# Patient Record
Sex: MALE | Race: WHITE | Employment: OTHER | URBAN - METROPOLITAN AREA
[De-identification: names, ages, dates, MRNs, and addresses within clinical notes are randomized per-mention and may not be internally consistent; named-entity substitution may affect disease eponyms.]

---

## 2019-09-12 ENCOUNTER — HOSPITAL ENCOUNTER (OUTPATIENT)
Age: 69
Discharge: HOME | End: 2019-09-14
Payer: COMMERCIAL

## 2019-09-12 PROCEDURE — 87075 CULTR BACTERIA EXCEPT BLOOD: CPT

## 2019-09-12 PROCEDURE — 87205 SMEAR GRAM STAIN: CPT

## 2019-09-12 PROCEDURE — 87070 CULTURE OTHR SPECIMN AEROBIC: CPT

## 2019-09-13 LAB — GRAM STAIN ORDERABLE: NORMAL

## 2019-09-14 LAB — WOUND/ABSCESS: NORMAL

## 2019-09-15 LAB
ANAEROBIC CULTURE: ABNORMAL
ANAEROBIC CULTURE: ABNORMAL
ORGANISM: ABNORMAL

## 2019-09-29 ENCOUNTER — HOSPITAL ENCOUNTER (INPATIENT)
Age: 69
LOS: 1 days | Discharge: HOME | DRG: 200 | End: 2019-09-30
Attending: EMERGENCY MEDICINE | Admitting: SURGERY
Payer: COMMERCIAL

## 2019-09-29 ENCOUNTER — APPOINTMENT (OUTPATIENT)
Dept: CT IMAGING | Age: 69
End: 2019-09-29
Payer: COMMERCIAL

## 2019-09-29 ENCOUNTER — HOSPITAL ENCOUNTER (OUTPATIENT)
Age: 69
Discharge: HOME | End: 2019-09-29
Payer: COMMERCIAL

## 2019-09-29 ENCOUNTER — HOSPITAL ENCOUNTER (EMERGENCY)
Age: 69
Discharge: ANOTHER HOSPITAL | End: 2019-09-29
Attending: EMERGENCY MEDICINE
Payer: COMMERCIAL

## 2019-09-29 VITALS
HEART RATE: 50 BPM | OXYGEN SATURATION: 98 % | DIASTOLIC BLOOD PRESSURE: 59 MMHG | WEIGHT: 160 LBS | TEMPERATURE: 98 F | HEIGHT: 66 IN | RESPIRATION RATE: 20 BRPM | SYSTOLIC BLOOD PRESSURE: 135 MMHG | BODY MASS INDEX: 25.71 KG/M2

## 2019-09-29 DIAGNOSIS — S22.42XA CLOSED FRACTURE OF MULTIPLE RIBS OF LEFT SIDE, INITIAL ENCOUNTER: Primary | ICD-10-CM

## 2019-09-29 DIAGNOSIS — S22.42XA CLOSED FRACTURE OF MULTIPLE RIBS OF LEFT SIDE, INITIAL ENCOUNTER: ICD-10-CM

## 2019-09-29 DIAGNOSIS — S27.1XXA TRAUMATIC HEMOTHORAX, INITIAL ENCOUNTER: Primary | ICD-10-CM

## 2019-09-29 PROBLEM — T14.90XA TRAUMA: Status: ACTIVE | Noted: 2019-09-29

## 2019-09-29 LAB
ALBUMIN SERPL-MCNC: 3.5 G/DL (ref 3.5–5.2)
ALP BLD-CCNC: 111 U/L (ref 40–129)
ALT SERPL-CCNC: 31 U/L (ref 0–40)
ANION GAP SERPL CALCULATED.3IONS-SCNC: 8 MMOL/L (ref 7–16)
AST SERPL-CCNC: 63 U/L (ref 0–39)
BASOPHILS ABSOLUTE: 0.05 E9/L (ref 0–0.2)
BASOPHILS RELATIVE PERCENT: 0.6 % (ref 0–2)
BILIRUB SERPL-MCNC: 0.8 MG/DL (ref 0–1.2)
BILIRUBIN DIRECT: 0.2 MG/DL (ref 0–0.3)
BILIRUBIN URINE: NEGATIVE
BILIRUBIN, INDIRECT: 0.6 MG/DL (ref 0–1)
BLOOD, URINE: NEGATIVE
BUN BLDV-MCNC: 12 MG/DL (ref 8–23)
CALCIUM SERPL-MCNC: 8.3 MG/DL (ref 8.6–10.2)
CHLORIDE BLD-SCNC: 99 MMOL/L (ref 98–107)
CLARITY: CLEAR
CO2: 30 MMOL/L (ref 22–29)
COLOR: YELLOW
CREAT SERPL-MCNC: 0.8 MG/DL (ref 0.7–1.2)
EOSINOPHILS ABSOLUTE: 0.23 E9/L (ref 0.05–0.5)
EOSINOPHILS RELATIVE PERCENT: 2.7 % (ref 0–6)
GFR AFRICAN AMERICAN: >60
GFR NON-AFRICAN AMERICAN: >60 ML/MIN/1.73
GLUCOSE BLD-MCNC: 104 MG/DL (ref 74–99)
GLUCOSE URINE: NEGATIVE MG/DL
HCT VFR BLD CALC: 44.1 % (ref 37–54)
HEMOGLOBIN: 14.7 G/DL (ref 12.5–16.5)
IMMATURE GRANULOCYTES #: 0.04 E9/L
IMMATURE GRANULOCYTES %: 0.5 % (ref 0–5)
INR BLD: 1
KETONES, URINE: NEGATIVE MG/DL
LEUKOCYTE ESTERASE, URINE: NEGATIVE
LYMPHOCYTES ABSOLUTE: 1.77 E9/L (ref 1.5–4)
LYMPHOCYTES RELATIVE PERCENT: 20.6 % (ref 20–42)
MCH RBC QN AUTO: 32.6 PG (ref 26–35)
MCHC RBC AUTO-ENTMCNC: 33.3 % (ref 32–34.5)
MCV RBC AUTO: 97.8 FL (ref 80–99.9)
MONOCYTES ABSOLUTE: 0.66 E9/L (ref 0.1–0.95)
MONOCYTES RELATIVE PERCENT: 7.7 % (ref 2–12)
NEUTROPHILS ABSOLUTE: 5.86 E9/L (ref 1.8–7.3)
NEUTROPHILS RELATIVE PERCENT: 67.9 % (ref 43–80)
NITRITE, URINE: NEGATIVE
PDW BLD-RTO: 11.4 FL (ref 11.5–15)
PH UA: 7.5 (ref 5–9)
PLATELET # BLD: 204 E9/L (ref 130–450)
PMV BLD AUTO: 10.6 FL (ref 7–12)
POTASSIUM REFLEX MAGNESIUM: 4.5 MMOL/L (ref 3.5–5)
PROTEIN UA: NEGATIVE MG/DL
PROTHROMBIN TIME: 11.3 SEC (ref 9.3–12.4)
RBC # BLD: 4.51 E12/L (ref 3.8–5.8)
SODIUM BLD-SCNC: 137 MMOL/L (ref 132–146)
SPECIFIC GRAVITY UA: 1.01 (ref 1–1.03)
TOTAL PROTEIN: 6.2 G/DL (ref 6.4–8.3)
UROBILINOGEN, URINE: 1 E.U./DL
WBC # BLD: 8.6 E9/L (ref 4.5–11.5)

## 2019-09-29 PROCEDURE — A0427 ALS1-EMERGENCY: HCPCS

## 2019-09-29 PROCEDURE — 80048 BASIC METABOLIC PNL TOTAL CA: CPT

## 2019-09-29 PROCEDURE — A0425 GROUND MILEAGE: HCPCS

## 2019-09-29 PROCEDURE — 85610 PROTHROMBIN TIME: CPT

## 2019-09-29 PROCEDURE — 99222 1ST HOSP IP/OBS MODERATE 55: CPT | Performed by: SURGERY

## 2019-09-29 PROCEDURE — 96374 THER/PROPH/DIAG INJ IV PUSH: CPT

## 2019-09-29 PROCEDURE — 99285 EMERGENCY DEPT VISIT HI MDM: CPT

## 2019-09-29 PROCEDURE — 74160 CT ABDOMEN W/CONTRAST: CPT

## 2019-09-29 PROCEDURE — 1200000000 HC SEMI PRIVATE

## 2019-09-29 PROCEDURE — 70450 CT HEAD/BRAIN W/O DYE: CPT

## 2019-09-29 PROCEDURE — 36415 COLL VENOUS BLD VENIPUNCTURE: CPT

## 2019-09-29 PROCEDURE — 81003 URINALYSIS AUTO W/O SCOPE: CPT

## 2019-09-29 PROCEDURE — 6360000002 HC RX W HCPCS: Performed by: EMERGENCY MEDICINE

## 2019-09-29 PROCEDURE — 80076 HEPATIC FUNCTION PANEL: CPT

## 2019-09-29 PROCEDURE — 6360000004 HC RX CONTRAST MEDICATION: Performed by: RADIOLOGY

## 2019-09-29 PROCEDURE — 71260 CT THORAX DX C+: CPT

## 2019-09-29 PROCEDURE — 85025 COMPLETE CBC W/AUTO DIFF WBC: CPT

## 2019-09-29 RX ORDER — 0.9 % SODIUM CHLORIDE 0.9 %
10 VIAL (ML) INJECTION 2 TIMES DAILY
Status: DISCONTINUED | OUTPATIENT
Start: 2019-09-29 | End: 2019-09-29 | Stop reason: HOSPADM

## 2019-09-29 RX ORDER — FENTANYL CITRATE 50 UG/ML
50 INJECTION, SOLUTION INTRAMUSCULAR; INTRAVENOUS ONCE
Status: COMPLETED | OUTPATIENT
Start: 2019-09-29 | End: 2019-09-29

## 2019-09-29 RX ORDER — METHOCARBAMOL 750 MG/1
750 TABLET, FILM COATED ORAL EVERY 6 HOURS PRN
Qty: 20 TABLET | Refills: 0 | Status: SHIPPED | OUTPATIENT
Start: 2019-09-29 | End: 2019-11-18

## 2019-09-29 RX ORDER — MORPHINE SULFATE 4 MG/ML
4 INJECTION, SOLUTION INTRAMUSCULAR; INTRAVENOUS ONCE
Status: COMPLETED | OUTPATIENT
Start: 2019-09-29 | End: 2019-09-29

## 2019-09-29 RX ORDER — SODIUM CHLORIDE 0.9 % (FLUSH) 0.9 %
SYRINGE (ML) INJECTION
Status: DISCONTINUED
Start: 2019-09-29 | End: 2019-09-29 | Stop reason: HOSPADM

## 2019-09-29 RX ADMIN — IOPAMIDOL 110 ML: 755 INJECTION, SOLUTION INTRAVENOUS at 13:18

## 2019-09-29 RX ADMIN — MORPHINE SULFATE 4 MG: 4 INJECTION, SOLUTION INTRAMUSCULAR; INTRAVENOUS at 12:26

## 2019-09-29 RX ADMIN — FENTANYL CITRATE 50 MCG: 50 INJECTION INTRAMUSCULAR; INTRAVENOUS at 16:07

## 2019-09-29 ASSESSMENT — ENCOUNTER SYMPTOMS
COUGH: 0
ABDOMINAL DISTENTION: 0
BACK PAIN: 1
TROUBLE SWALLOWING: 0
VOMITING: 0
CHEST TIGHTNESS: 0
ABDOMINAL PAIN: 0
NAUSEA: 0
VISUAL CHANGE: 0
BOWEL INCONTINENCE: 0
SHORTNESS OF BREATH: 1
PHOTOPHOBIA: 0

## 2019-09-29 ASSESSMENT — PAIN SCALES - GENERAL
PAINLEVEL_OUTOF10: 9
PAINLEVEL_OUTOF10: 8

## 2019-09-29 ASSESSMENT — PAIN DESCRIPTION - PAIN TYPE
TYPE: ACUTE PAIN

## 2019-09-29 ASSESSMENT — PAIN DESCRIPTION - DESCRIPTORS: DESCRIPTORS: SHARP

## 2019-09-29 ASSESSMENT — PAIN DESCRIPTION - LOCATION
LOCATION: RIB CAGE

## 2019-09-29 ASSESSMENT — PAIN DESCRIPTION - ORIENTATION
ORIENTATION: LEFT

## 2019-09-29 ASSESSMENT — PAIN DESCRIPTION - FREQUENCY: FREQUENCY: INTERMITTENT

## 2019-09-30 ENCOUNTER — APPOINTMENT (OUTPATIENT)
Dept: CT IMAGING | Age: 69
DRG: 200 | End: 2019-09-30
Payer: COMMERCIAL

## 2019-09-30 VITALS
RESPIRATION RATE: 16 BRPM | BODY MASS INDEX: 25.71 KG/M2 | TEMPERATURE: 97.3 F | WEIGHT: 160 LBS | DIASTOLIC BLOOD PRESSURE: 59 MMHG | OXYGEN SATURATION: 94 % | HEART RATE: 50 BPM | SYSTOLIC BLOOD PRESSURE: 120 MMHG | HEIGHT: 66 IN

## 2019-09-30 LAB
ANION GAP SERPL CALCULATED.3IONS-SCNC: 12 MMOL/L (ref 7–16)
BUN BLDV-MCNC: 11 MG/DL (ref 8–23)
CALCIUM SERPL-MCNC: 8.2 MG/DL (ref 8.6–10.2)
CHLORIDE BLD-SCNC: 100 MMOL/L (ref 98–107)
CO2: 27 MMOL/L (ref 22–29)
CREAT SERPL-MCNC: 0.8 MG/DL (ref 0.7–1.2)
GFR AFRICAN AMERICAN: >60
GFR NON-AFRICAN AMERICAN: >60 ML/MIN/1.73
GLUCOSE BLD-MCNC: 109 MG/DL (ref 74–99)
HCT VFR BLD CALC: 40.4 % (ref 37–54)
HEMOGLOBIN: 13.2 G/DL (ref 12.5–16.5)
MCH RBC QN AUTO: 31.8 PG (ref 26–35)
MCHC RBC AUTO-ENTMCNC: 32.7 % (ref 32–34.5)
MCV RBC AUTO: 97.3 FL (ref 80–99.9)
PDW BLD-RTO: 11.3 FL (ref 11.5–15)
PLATELET # BLD: 178 E9/L (ref 130–450)
PMV BLD AUTO: 10.9 FL (ref 7–12)
POTASSIUM REFLEX MAGNESIUM: 4.1 MMOL/L (ref 3.5–5)
RBC # BLD: 4.15 E12/L (ref 3.8–5.8)
SODIUM BLD-SCNC: 139 MMOL/L (ref 132–146)
WBC # BLD: 7.2 E9/L (ref 4.5–11.5)

## 2019-09-30 PROCEDURE — 6360000002 HC RX W HCPCS: Performed by: STUDENT IN AN ORGANIZED HEALTH CARE EDUCATION/TRAINING PROGRAM

## 2019-09-30 PROCEDURE — 2580000003 HC RX 258: Performed by: STUDENT IN AN ORGANIZED HEALTH CARE EDUCATION/TRAINING PROGRAM

## 2019-09-30 PROCEDURE — 97162 PT EVAL MOD COMPLEX 30 MIN: CPT

## 2019-09-30 PROCEDURE — 97530 THERAPEUTIC ACTIVITIES: CPT

## 2019-09-30 PROCEDURE — 85027 COMPLETE CBC AUTOMATED: CPT

## 2019-09-30 PROCEDURE — 80048 BASIC METABOLIC PNL TOTAL CA: CPT

## 2019-09-30 PROCEDURE — 72128 CT CHEST SPINE W/O DYE: CPT

## 2019-09-30 PROCEDURE — 72131 CT LUMBAR SPINE W/O DYE: CPT

## 2019-09-30 PROCEDURE — 36415 COLL VENOUS BLD VENIPUNCTURE: CPT

## 2019-09-30 PROCEDURE — 99238 HOSP IP/OBS DSCHRG MGMT 30/<: CPT | Performed by: SURGERY

## 2019-09-30 PROCEDURE — 6370000000 HC RX 637 (ALT 250 FOR IP): Performed by: STUDENT IN AN ORGANIZED HEALTH CARE EDUCATION/TRAINING PROGRAM

## 2019-09-30 PROCEDURE — 97165 OT EVAL LOW COMPLEX 30 MIN: CPT

## 2019-09-30 RX ORDER — NAPROXEN 500 MG/1
500 TABLET ORAL 2 TIMES DAILY
Status: DISCONTINUED | OUTPATIENT
Start: 2019-09-30 | End: 2019-09-30 | Stop reason: HOSPADM

## 2019-09-30 RX ORDER — SODIUM CHLORIDE 0.9 % (FLUSH) 0.9 %
10 SYRINGE (ML) INJECTION EVERY 12 HOURS SCHEDULED
Status: DISCONTINUED | OUTPATIENT
Start: 2019-09-30 | End: 2019-09-30 | Stop reason: HOSPADM

## 2019-09-30 RX ORDER — METHOCARBAMOL 500 MG/1
1000 TABLET, FILM COATED ORAL 4 TIMES DAILY
Status: DISCONTINUED | OUTPATIENT
Start: 2019-09-30 | End: 2019-09-30 | Stop reason: HOSPADM

## 2019-09-30 RX ORDER — PROPRANOLOL HYDROCHLORIDE 80 MG/1
80 CAPSULE, EXTENDED RELEASE ORAL DAILY
Status: DISCONTINUED | OUTPATIENT
Start: 2019-09-30 | End: 2019-09-30 | Stop reason: HOSPADM

## 2019-09-30 RX ORDER — MORPHINE SULFATE 4 MG/ML
4 INJECTION, SOLUTION INTRAMUSCULAR; INTRAVENOUS
Status: DISCONTINUED | OUTPATIENT
Start: 2019-09-30 | End: 2019-09-30 | Stop reason: HOSPADM

## 2019-09-30 RX ORDER — DOCUSATE SODIUM 100 MG/1
100 CAPSULE, LIQUID FILLED ORAL 2 TIMES DAILY
Status: DISCONTINUED | OUTPATIENT
Start: 2019-09-30 | End: 2019-09-30 | Stop reason: HOSPADM

## 2019-09-30 RX ORDER — ONDANSETRON 2 MG/ML
4 INJECTION INTRAMUSCULAR; INTRAVENOUS EVERY 6 HOURS PRN
Status: DISCONTINUED | OUTPATIENT
Start: 2019-09-30 | End: 2019-09-30 | Stop reason: HOSPADM

## 2019-09-30 RX ORDER — POLYETHYLENE GLYCOL 3350 17 G/17G
17 POWDER, FOR SOLUTION ORAL DAILY
Status: DISCONTINUED | OUTPATIENT
Start: 2019-09-30 | End: 2019-09-30 | Stop reason: HOSPADM

## 2019-09-30 RX ORDER — POLYVINYL ALCOHOL 14 MG/ML
1 SOLUTION/ DROPS OPHTHALMIC EVERY 4 HOURS PRN
Status: DISCONTINUED | OUTPATIENT
Start: 2019-09-30 | End: 2019-09-30 | Stop reason: HOSPADM

## 2019-09-30 RX ORDER — SODIUM CHLORIDE 0.9 % (FLUSH) 0.9 %
10 SYRINGE (ML) INJECTION PRN
Status: DISCONTINUED | OUTPATIENT
Start: 2019-09-30 | End: 2019-09-30 | Stop reason: HOSPADM

## 2019-09-30 RX ORDER — ACETAMINOPHEN 325 MG/1
650 TABLET ORAL EVERY 4 HOURS PRN
Status: DISCONTINUED | OUTPATIENT
Start: 2019-09-30 | End: 2019-09-30 | Stop reason: HOSPADM

## 2019-09-30 RX ORDER — OXYCODONE HYDROCHLORIDE 15 MG/1
30 TABLET ORAL EVERY 8 HOURS PRN
Status: DISCONTINUED | OUTPATIENT
Start: 2019-09-30 | End: 2019-09-30 | Stop reason: HOSPADM

## 2019-09-30 RX ORDER — TAMSULOSIN HYDROCHLORIDE 0.4 MG/1
0.4 CAPSULE ORAL DAILY
Status: DISCONTINUED | OUTPATIENT
Start: 2019-09-30 | End: 2019-09-30 | Stop reason: HOSPADM

## 2019-09-30 RX ADMIN — MORPHINE SULFATE 4 MG: 4 INJECTION, SOLUTION INTRAMUSCULAR; INTRAVENOUS at 07:33

## 2019-09-30 RX ADMIN — OXYCODONE HYDROCHLORIDE 30 MG: 15 TABLET ORAL at 03:12

## 2019-09-30 RX ADMIN — NAPROXEN 500 MG: 500 TABLET ORAL at 08:43

## 2019-09-30 RX ADMIN — METHOCARBAMOL TABLETS 1000 MG: 500 TABLET, COATED ORAL at 08:44

## 2019-09-30 RX ADMIN — MORPHINE SULFATE 4 MG: 4 INJECTION, SOLUTION INTRAMUSCULAR; INTRAVENOUS at 00:44

## 2019-09-30 RX ADMIN — TAMSULOSIN HYDROCHLORIDE 0.4 MG: 0.4 CAPSULE ORAL at 08:44

## 2019-09-30 RX ADMIN — NAPROXEN 500 MG: 500 TABLET ORAL at 00:44

## 2019-09-30 RX ADMIN — DOCUSATE SODIUM 100 MG: 100 CAPSULE, LIQUID FILLED ORAL at 08:44

## 2019-09-30 RX ADMIN — METHOCARBAMOL TABLETS 1000 MG: 500 TABLET, COATED ORAL at 01:00

## 2019-09-30 RX ADMIN — Medication 10 ML: at 08:43

## 2019-09-30 RX ADMIN — METHOCARBAMOL TABLETS 1000 MG: 500 TABLET, COATED ORAL at 13:38

## 2019-09-30 RX ADMIN — VORTIOXETINE 10 MG: 10 TABLET, FILM COATED ORAL at 08:44

## 2019-09-30 ASSESSMENT — PAIN DESCRIPTION - LOCATION
LOCATION: RIB CAGE

## 2019-09-30 ASSESSMENT — PAIN DESCRIPTION - ONSET
ONSET: ON-GOING

## 2019-09-30 ASSESSMENT — PAIN - FUNCTIONAL ASSESSMENT
PAIN_FUNCTIONAL_ASSESSMENT: PREVENTS OR INTERFERES SOME ACTIVE ACTIVITIES AND ADLS

## 2019-09-30 ASSESSMENT — PAIN DESCRIPTION - FREQUENCY
FREQUENCY: CONTINUOUS

## 2019-09-30 ASSESSMENT — PAIN SCALES - GENERAL
PAINLEVEL_OUTOF10: 8
PAINLEVEL_OUTOF10: 8
PAINLEVEL_OUTOF10: 4
PAINLEVEL_OUTOF10: 9

## 2019-09-30 ASSESSMENT — PAIN DESCRIPTION - DESCRIPTORS
DESCRIPTORS: ACHING;DISCOMFORT;SHARP
DESCRIPTORS: ACHING;DISCOMFORT;CONSTANT;SORE
DESCRIPTORS: ACHING;DISCOMFORT;SHARP

## 2019-09-30 ASSESSMENT — PAIN DESCRIPTION - PAIN TYPE
TYPE: ACUTE PAIN

## 2019-09-30 ASSESSMENT — PAIN DESCRIPTION - ORIENTATION
ORIENTATION: LEFT

## 2019-09-30 ASSESSMENT — PAIN DESCRIPTION - PROGRESSION
CLINICAL_PROGRESSION: NOT CHANGED

## 2022-04-28 ENCOUNTER — HOSPITAL ENCOUNTER (OUTPATIENT)
Age: 72
Discharge: HOME | End: 2022-04-30

## 2022-04-28 PROCEDURE — 88305 TISSUE EXAM BY PATHOLOGIST: CPT

## 2022-05-24 RX ORDER — DEXTROAMPHETAMINE SACCHARATE, AMPHETAMINE ASPARTATE, DEXTROAMPHETAMINE SULFATE AND AMPHETAMINE SULFATE 5; 5; 5; 5 MG/1; MG/1; MG/1; MG/1
20 TABLET ORAL 3 TIMES DAILY
COMMUNITY

## 2022-05-24 RX ORDER — DESVENLAFAXINE 100 MG/1
TABLET, EXTENDED RELEASE ORAL DAILY
COMMUNITY
End: 2022-07-07 | Stop reason: ALTCHOICE

## 2022-05-24 RX ORDER — DIAZEPAM 10 MG/1
10 TABLET ORAL NIGHTLY PRN
COMMUNITY

## 2022-05-24 RX ORDER — CHOLECALCIFEROL (VITAMIN D3) 125 MCG
10 CAPSULE ORAL NIGHTLY
COMMUNITY

## 2022-05-24 NOTE — PROGRESS NOTES
[facility] PRE-ADMISSION TESTING INSTRUCTIONS    The Preadmission Testing patient is instructed accordingly using the following criteria (check applicable):    ARRIVAL INSTRUCTIONS:  [x] Parking the day of Surgery is located in the Main Entrance lot. Upon entering the door, make an immediate right to the surgery reception desk    [x] Bring photo ID and insurance card    [] Bring in a copy of Living will or Durable Power of  papers. [x] Please be sure to arrange for responsible adult to provide transportation to and from the hospital    [x] Please arrange for responsible adult to be with you for the 24 hour period post procedure due to having anesthesia    [x] If you awake am of surgery not feeling well or have temperature >100 please call [phone]    GENERAL INSTRUCTIONS:    [x] Nothing by mouth after midnight, including gum, candy, mints or water    [x] You may brush your teeth, but do not swallow any water    [x] Take medications as instructed with 1-2 oz of water    [] Stop herbal supplements and vitamins 5 days prior to procedure    [] Follow preop dosing of blood thinners per physician instructions    [] Take 1/2 dose of evening insulin, but no insulin after midnight    [] No oral diabetic medications after midnight    [] If diabetic and have low blood sugar or feel symptomatic, take 1-2oz apple juice only    [] Bring inhalers day of surgery    [] Bring C-PAP/ Bi-Pap day of surgery    [] Bring urine specimen day of surgery    [x] Shower or bath with soap, lather and rinse well, AM of Surgery, no lotion, powders or creams to surgical site    [] Follow bowel prep as instructed per surgeon    [x] No tobacco products within 24 hours of surgery     [x] No alcohol or illegal drug use within 24 hours of surgery.     [x] Jewelry, body piercing's, eyeglasses, contact lenses and dentures are not permitted into surgery (bring cases)      [] Please do not wear any nail polish, make up or hair products on the day of surgery    [x] You can expect a call the business day prior to procedure to notify you if your arrival time changes    [x] If you receive a survey after surgery we would greatly appreciate your comments    [] Parent/guardian of a minor must accompany their child and remain on the premises  the entire time they are under our care     [] Pediatric patients may bring favorite toy, blanket or comfort item with them    [] A caregiver or family member must remain with the patient during their stay if they are mentally handicapped, have dementia, disoriented or unable to use a call light or would be a safety concern if left unattended    [x] Please notify surgeon if you develop any illness between now and time of surgery (cold, cough, sore throat, fever, nausea, vomiting) or any signs of infections  including skin, wounds, and dental.    [x]  The Outpatient Pharmacy is available to fill your prescription here on your day of surgery, ask your preop nurse for details    [] Other instructions    EDUCATIONAL MATERIALS PROVIDED:    [] PAT Preoperative Education Packet/Booklet     [] Medication List    [] Transfusion bracelet applied with instructions    [] Shower with soap, lather and rinse well, and use CHG wipes provided the evening before surgery as instructed    [] Incentive spirometer with instructions

## 2022-05-25 ENCOUNTER — PREP FOR PROCEDURE (OUTPATIENT)
Dept: GASTROENTEROLOGY | Age: 72
End: 2022-05-25

## 2022-05-25 DIAGNOSIS — R10.84 GENERALIZED ABDOMINAL PAIN: Primary | ICD-10-CM

## 2022-05-25 RX ORDER — SODIUM CHLORIDE 0.9 % (FLUSH) 0.9 %
5-40 SYRINGE (ML) INJECTION PRN
Status: CANCELLED | OUTPATIENT
Start: 2022-05-25

## 2022-05-25 RX ORDER — SODIUM CHLORIDE 9 MG/ML
INJECTION, SOLUTION INTRAVENOUS CONTINUOUS
Status: CANCELLED | OUTPATIENT
Start: 2022-05-25

## 2022-05-25 RX ORDER — SODIUM CHLORIDE 9 MG/ML
25 INJECTION, SOLUTION INTRAVENOUS PRN
Status: CANCELLED | OUTPATIENT
Start: 2022-05-25

## 2022-05-25 RX ORDER — SODIUM CHLORIDE, SODIUM LACTATE, POTASSIUM CHLORIDE, CALCIUM CHLORIDE 600; 310; 30; 20 MG/100ML; MG/100ML; MG/100ML; MG/100ML
1000 INJECTION, SOLUTION INTRAVENOUS CONTINUOUS
Status: CANCELLED | OUTPATIENT
Start: 2022-05-25

## 2022-05-25 RX ORDER — SODIUM CHLORIDE 0.9 % (FLUSH) 0.9 %
5-40 SYRINGE (ML) INJECTION EVERY 12 HOURS SCHEDULED
Status: CANCELLED | OUTPATIENT
Start: 2022-05-25

## 2022-05-25 NOTE — H&P
Gastroenterology      Pre-operative History and Physical      HISTORY OF PRESENT ILLNESS:   Nara Borrero is a 73year old  male patient who is seen at the request of Carlos Samano for a consultation/initial visit. Patient presents today with abnormal CT/MRI reports. Patient states \"I want my gallbladder out\". States he is having \"horrible pain in my stomach\". Anatomy of GB, pancreas, liver and bile ducts explained to the patient, all questions answered. States diarrhea is what started al of this work up. Diarrhea started over 1 year ago. Using Imodium & Lomotil daily, but again diarrhea is still present. Going 6-8 times a day. Stools are loose, with pita appearance. Reports he just had labs completed recently, will try to obtain. States his weight is fluctuating  few pounds at times. Denies any drastic weight loss recently. POC reviewed with the patient including the need for a ERCP, all questions answered. Patient also educated with the risk of pancreatitis with this procedure, will be kept over night for hospitalization to [facility] for pancreatitis. Just had a colonoscopy with Dr. Albertina Mayfield, \"couple polyps removed\".    Patient denies melena, hematochezia, hematemesis    HISTORY:   Past Medical History:   Diagnosis Date    Arthritis     OSTEO    Back problem     lumbar disc with sciatic pain down lt leg;    Depression     ADHD; bi-polar    Enlarged gallbladder     Essential tremor     Fatty liver     Hyperlipidemia     Irritable bowel syndrome     Osteoarthritis     Reflux esophagitis     Tremor     takes inderal for this    Vitamin D deficiency        PERTINENT FAMILY HISTORY:    Family History   Problem Relation Age of Onset    Coronary Art Dis Mother        MEDICATIONS:    Current Outpatient Medications:     desvenlafaxine succinate (PRISTIQ) 100 MG TB24 extended release tablet, Take by mouth daily, Disp: , Rfl:     amphetamine-dextroamphetamine (ADDERALL) 20 MG tablet, Take 20 mg by mouth 3 times daily. , Disp: , Rfl:     melatonin 5 mg TABS tablet, Take 5 mg by mouth nightly, Disp: , Rfl:     diazePAM (VALIUM) 10 MG tablet, Take 10 mg by mouth nightly., Disp: , Rfl:     naloxone 4 MG/0.1ML LIQD nasal spray, 1 spray by Nasal route as needed for Opioid Reversal, Disp: 1 each, Rfl: 5    tamsulosin (FLOMAX) 0.4 MG capsule, Take 1 capsule by mouth daily, Disp: 90 capsule, Rfl: 3    triamcinolone (KENALOG) 0.1 % cream, Apply topically 2 times daily. , Disp: 15 g, Rfl: 1    naproxen (NAPROSYN) 500 MG tablet, Take 1 tablet by mouth 2 times daily, Disp: 60 tablet, Rfl: 5    propranolol (INDERAL LA) 80 MG extended release capsule, Take 1 capsule by mouth daily, Disp: 180 capsule, Rfl: 1    diclofenac sodium 1 % GEL, Apply 2 g topically 2 times daily, Disp: 1 Tube, Rfl: 1    ALLERGIES:  Quinolones    PHYSICAL EXAM/GENERAL APPEARANCE:   Constitutional:  Appearance: well-developed, appropriate grooming, in no acute distress. .  Communication: conversation appropriate. Skin:  Inspection: no rashes, ulcers, icterus or other lesions. Eyes:  Conjunctivae/lids: lids normal,anicteric sclerae, moist conjunctivae. Pupils/irises: PERRLA. ENMT:  External: normal external inspection of the nose and ears, atraumautic. Hearing: within normal limits. Oropharynx: normal tongue, hard and soft palate; posterior pharynx without erythema, exudate or lesions. Neck:  Neck: normal motion and central trachea. Thyroid: normal size, consistency and position; no masses or tenderness. Respiratory:  Effort: normal respiratory effort and no intercostal retractions. Auscultation: normal breath sounds, no rubs, wheezes or rhonchi. Chest:  Inspection: symetrical without visualized masses. Cardiovascular:  Palpation: normal size,PMI is palpable in the 5th intercostal space, left midclavicular line,normal rythym. Auscultation: normal, S1 and S2,no gallops,no rubs or murmurs.   Gastrointestinal/Abdomen:  Abdomen: normal consistency, no tenderness or masses,normal bowel sounds. Liver/Spleen: normal,normal size,not palpable. Extremities:  RLE: no cyanosis, clubbing, or edema. Normal peripheral pulses. Heddy  LLE: no cyanosis, clubbing or edema. Normal peripheral pulses. Heddy  Psychiatric:  Orientation: oriented to time, space and person.     OTHER SIGNIFICANT FINDINGS: None    IMPRESSION/INITIAL DIAGNOSIS:   Ampullary stenosis  CBD dilation  Fatty liver  Diarrhea  Abdominal pain  Colon polyps          Electronically signed by Tuan Cruz MD on 5/25/2022 at 8:12 AM

## 2022-05-27 ENCOUNTER — ANESTHESIA (OUTPATIENT)
Dept: ENDOSCOPY | Age: 72
End: 2022-05-27
Payer: COMMERCIAL

## 2022-05-27 ENCOUNTER — APPOINTMENT (OUTPATIENT)
Dept: GENERAL RADIOLOGY | Age: 72
End: 2022-05-27
Attending: INTERNAL MEDICINE
Payer: COMMERCIAL

## 2022-05-27 ENCOUNTER — ANESTHESIA EVENT (OUTPATIENT)
Dept: ENDOSCOPY | Age: 72
End: 2022-05-27
Payer: COMMERCIAL

## 2022-05-27 ENCOUNTER — HOSPITAL ENCOUNTER (OUTPATIENT)
Age: 72
Setting detail: OBSERVATION
Discharge: HOME | End: 2022-05-28
Attending: INTERNAL MEDICINE | Admitting: INTERNAL MEDICINE
Payer: COMMERCIAL

## 2022-05-27 PROBLEM — Z98.890 S/P ERCP: Status: ACTIVE | Noted: 2022-05-27

## 2022-05-27 LAB
AMYLASE: 65 U/L (ref 20–100)
BASOPHILS ABSOLUTE: 0.05 E9/L (ref 0–0.2)
BASOPHILS RELATIVE PERCENT: 0.6 % (ref 0–2)
EOSINOPHILS ABSOLUTE: 0.15 E9/L (ref 0.05–0.5)
EOSINOPHILS RELATIVE PERCENT: 1.9 % (ref 0–6)
HCT VFR BLD CALC: 44.8 % (ref 37–54)
HEMOGLOBIN: 15 G/DL (ref 12.5–16.5)
IMMATURE GRANULOCYTES #: 0.02 E9/L
IMMATURE GRANULOCYTES %: 0.2 % (ref 0–5)
INR BLD: 0.9
LIPASE: 68 U/L (ref 13–60)
LYMPHOCYTES ABSOLUTE: 1.92 E9/L (ref 1.5–4)
LYMPHOCYTES RELATIVE PERCENT: 23.8 % (ref 20–42)
MCH RBC QN AUTO: 33.4 PG (ref 26–35)
MCHC RBC AUTO-ENTMCNC: 33.5 % (ref 32–34.5)
MCV RBC AUTO: 99.8 FL (ref 80–99.9)
MONOCYTES ABSOLUTE: 0.69 E9/L (ref 0.1–0.95)
MONOCYTES RELATIVE PERCENT: 8.5 % (ref 2–12)
NEUTROPHILS ABSOLUTE: 5.25 E9/L (ref 1.8–7.3)
NEUTROPHILS RELATIVE PERCENT: 65 % (ref 43–80)
PDW BLD-RTO: 12 FL (ref 11.5–15)
PLATELET # BLD: 198 E9/L (ref 130–450)
PMV BLD AUTO: 10.9 FL (ref 7–12)
PROTHROMBIN TIME: 10.2 SEC (ref 9.3–12.4)
RBC # BLD: 4.49 E12/L (ref 3.8–5.8)
WBC # BLD: 8.1 E9/L (ref 4.5–11.5)

## 2022-05-27 PROCEDURE — 6370000000 HC RX 637 (ALT 250 FOR IP): Performed by: INTERNAL MEDICINE

## 2022-05-27 PROCEDURE — 2500000003 HC RX 250 WO HCPCS: Performed by: NURSE ANESTHETIST, CERTIFIED REGISTERED

## 2022-05-27 PROCEDURE — C1874 STENT, COATED/COV W/DEL SYS: HCPCS | Performed by: INTERNAL MEDICINE

## 2022-05-27 PROCEDURE — 3700000001 HC ADD 15 MINUTES (ANESTHESIA): Performed by: INTERNAL MEDICINE

## 2022-05-27 PROCEDURE — 2580000003 HC RX 258: Performed by: NURSE ANESTHETIST, CERTIFIED REGISTERED

## 2022-05-27 PROCEDURE — 2500000003 HC RX 250 WO HCPCS

## 2022-05-27 PROCEDURE — 74330 X-RAY BILE/PANC ENDOSCOPY: CPT

## 2022-05-27 PROCEDURE — 2720000010 HC SURG SUPPLY STERILE: Performed by: INTERNAL MEDICINE

## 2022-05-27 PROCEDURE — 6360000002 HC RX W HCPCS: Performed by: ANESTHESIOLOGY

## 2022-05-27 PROCEDURE — 2709999900 HC NON-CHARGEABLE SUPPLY: Performed by: INTERNAL MEDICINE

## 2022-05-27 PROCEDURE — G0378 HOSPITAL OBSERVATION PER HR: HCPCS

## 2022-05-27 PROCEDURE — 2580000003 HC RX 258: Performed by: INTERNAL MEDICINE

## 2022-05-27 PROCEDURE — 6360000002 HC RX W HCPCS

## 2022-05-27 PROCEDURE — 6360000004 HC RX CONTRAST MEDICATION: Performed by: RADIOLOGY

## 2022-05-27 PROCEDURE — 6370000000 HC RX 637 (ALT 250 FOR IP): Performed by: NURSE PRACTITIONER

## 2022-05-27 PROCEDURE — 7100000011 HC PHASE II RECOVERY - ADDTL 15 MIN: Performed by: INTERNAL MEDICINE

## 2022-05-27 PROCEDURE — 7100000010 HC PHASE II RECOVERY - FIRST 15 MIN: Performed by: INTERNAL MEDICINE

## 2022-05-27 PROCEDURE — 88305 TISSUE EXAM BY PATHOLOGIST: CPT

## 2022-05-27 PROCEDURE — C1769 GUIDE WIRE: HCPCS | Performed by: INTERNAL MEDICINE

## 2022-05-27 PROCEDURE — 6360000002 HC RX W HCPCS: Performed by: INTERNAL MEDICINE

## 2022-05-27 PROCEDURE — 88108 CYTOPATH CONCENTRATE TECH: CPT

## 2022-05-27 PROCEDURE — 3609015100 HC ERCP STENT PLACEMENT BILIARY/PANCREATIC DUCT: Performed by: INTERNAL MEDICINE

## 2022-05-27 PROCEDURE — 83690 ASSAY OF LIPASE: CPT

## 2022-05-27 PROCEDURE — 85610 PROTHROMBIN TIME: CPT

## 2022-05-27 PROCEDURE — 36415 COLL VENOUS BLD VENIPUNCTURE: CPT

## 2022-05-27 PROCEDURE — 6360000002 HC RX W HCPCS: Performed by: NURSE ANESTHETIST, CERTIFIED REGISTERED

## 2022-05-27 PROCEDURE — 82150 ASSAY OF AMYLASE: CPT

## 2022-05-27 PROCEDURE — 3700000000 HC ANESTHESIA ATTENDED CARE: Performed by: INTERNAL MEDICINE

## 2022-05-27 PROCEDURE — 85025 COMPLETE CBC W/AUTO DIFF WBC: CPT

## 2022-05-27 DEVICE — STENT SYSTEM RMV
Type: IMPLANTABLE DEVICE | Status: NON-FUNCTIONAL
Brand: WALLFLEX BILIARY
Removed: 2022-07-11

## 2022-05-27 RX ORDER — DEXTROAMPHETAMINE SACCHARATE, AMPHETAMINE ASPARTATE, DEXTROAMPHETAMINE SULFATE AND AMPHETAMINE SULFATE 5; 5; 5; 5 MG/1; MG/1; MG/1; MG/1
20 TABLET ORAL 3 TIMES DAILY
Status: DISCONTINUED | OUTPATIENT
Start: 2022-05-27 | End: 2022-05-28 | Stop reason: HOSPADM

## 2022-05-27 RX ORDER — FENTANYL CITRATE 50 UG/ML
25 INJECTION, SOLUTION INTRAMUSCULAR; INTRAVENOUS EVERY 5 MIN PRN
Status: DISCONTINUED | OUTPATIENT
Start: 2022-05-27 | End: 2022-05-27 | Stop reason: HOSPADM

## 2022-05-27 RX ORDER — NALOXONE HYDROCHLORIDE 0.4 MG/ML
0.4 INJECTION, SOLUTION INTRAMUSCULAR; INTRAVENOUS; SUBCUTANEOUS PRN
Status: DISCONTINUED | OUTPATIENT
Start: 2022-05-27 | End: 2022-05-28 | Stop reason: HOSPADM

## 2022-05-27 RX ORDER — DIPHENOXYLATE HYDROCHLORIDE AND ATROPINE SULFATE 2.5; .025 MG/1; MG/1
2 TABLET ORAL DAILY PRN
COMMUNITY

## 2022-05-27 RX ORDER — CETIRIZINE HYDROCHLORIDE 10 MG/1
10 TABLET ORAL DAILY
COMMUNITY

## 2022-05-27 RX ORDER — SODIUM CHLORIDE 9 MG/ML
INJECTION, SOLUTION INTRAVENOUS PRN
Status: DISCONTINUED | OUTPATIENT
Start: 2022-05-27 | End: 2022-05-27 | Stop reason: HOSPADM

## 2022-05-27 RX ORDER — SODIUM CHLORIDE 9 MG/ML
INJECTION, SOLUTION INTRAVENOUS CONTINUOUS
Status: DISCONTINUED | OUTPATIENT
Start: 2022-05-27 | End: 2022-05-28 | Stop reason: HOSPADM

## 2022-05-27 RX ORDER — DIPHENHYDRAMINE HYDROCHLORIDE 50 MG/ML
12.5 INJECTION INTRAMUSCULAR; INTRAVENOUS
Status: DISCONTINUED | OUTPATIENT
Start: 2022-05-27 | End: 2022-05-27 | Stop reason: HOSPADM

## 2022-05-27 RX ORDER — SODIUM CHLORIDE, SODIUM LACTATE, POTASSIUM CHLORIDE, CALCIUM CHLORIDE 600; 310; 30; 20 MG/100ML; MG/100ML; MG/100ML; MG/100ML
1000 INJECTION, SOLUTION INTRAVENOUS CONTINUOUS
Status: DISCONTINUED | OUTPATIENT
Start: 2022-05-27 | End: 2022-05-27

## 2022-05-27 RX ORDER — MEPERIDINE HYDROCHLORIDE 25 MG/ML
12.5 INJECTION INTRAMUSCULAR; INTRAVENOUS; SUBCUTANEOUS ONCE
Status: DISCONTINUED | OUTPATIENT
Start: 2022-05-27 | End: 2022-05-27 | Stop reason: HOSPADM

## 2022-05-27 RX ORDER — PROPOFOL 10 MG/ML
INJECTION, EMULSION INTRAVENOUS CONTINUOUS PRN
Status: DISCONTINUED | OUTPATIENT
Start: 2022-05-27 | End: 2022-05-27 | Stop reason: SDUPTHER

## 2022-05-27 RX ORDER — SODIUM CHLORIDE 0.9 % (FLUSH) 0.9 %
5-40 SYRINGE (ML) INJECTION EVERY 12 HOURS SCHEDULED
Status: DISCONTINUED | OUTPATIENT
Start: 2022-05-27 | End: 2022-05-27 | Stop reason: HOSPADM

## 2022-05-27 RX ORDER — OXYCODONE HYDROCHLORIDE 30 MG/1
30 TABLET ORAL EVERY 6 HOURS PRN
COMMUNITY

## 2022-05-27 RX ORDER — SODIUM CHLORIDE 9 MG/ML
INJECTION, SOLUTION INTRAVENOUS CONTINUOUS PRN
Status: DISCONTINUED | OUTPATIENT
Start: 2022-05-27 | End: 2022-05-27 | Stop reason: SDUPTHER

## 2022-05-27 RX ORDER — TAMSULOSIN HYDROCHLORIDE 0.4 MG/1
0.4 CAPSULE ORAL DAILY
Status: DISCONTINUED | OUTPATIENT
Start: 2022-05-27 | End: 2022-05-28 | Stop reason: HOSPADM

## 2022-05-27 RX ORDER — HYDRALAZINE HYDROCHLORIDE 20 MG/ML
5 INJECTION INTRAMUSCULAR; INTRAVENOUS
Status: DISCONTINUED | OUTPATIENT
Start: 2022-05-27 | End: 2022-05-27 | Stop reason: HOSPADM

## 2022-05-27 RX ORDER — NALOXONE HYDROCHLORIDE 4 MG/.1ML
1 SPRAY NASAL PRN
Status: DISCONTINUED | OUTPATIENT
Start: 2022-05-27 | End: 2022-05-27 | Stop reason: RX

## 2022-05-27 RX ORDER — ONDANSETRON 2 MG/ML
4 INJECTION INTRAMUSCULAR; INTRAVENOUS EVERY 4 HOURS PRN
Status: DISCONTINUED | OUTPATIENT
Start: 2022-05-27 | End: 2022-05-28 | Stop reason: HOSPADM

## 2022-05-27 RX ORDER — NAPROXEN 500 MG/1
500 TABLET ORAL 2 TIMES DAILY
Status: DISCONTINUED | OUTPATIENT
Start: 2022-05-27 | End: 2022-05-28 | Stop reason: HOSPADM

## 2022-05-27 RX ORDER — SODIUM CHLORIDE 9 MG/ML
25 INJECTION, SOLUTION INTRAVENOUS PRN
Status: DISCONTINUED | OUTPATIENT
Start: 2022-05-27 | End: 2022-05-28 | Stop reason: HOSPADM

## 2022-05-27 RX ORDER — GLYCOPYRROLATE 0.2 MG/ML
INJECTION INTRAMUSCULAR; INTRAVENOUS PRN
Status: DISCONTINUED | OUTPATIENT
Start: 2022-05-27 | End: 2022-05-27 | Stop reason: SDUPTHER

## 2022-05-27 RX ORDER — LANOLIN ALCOHOL/MO/W.PET/CERES
5 CREAM (GRAM) TOPICAL NIGHTLY
Status: DISCONTINUED | OUTPATIENT
Start: 2022-05-27 | End: 2022-05-28 | Stop reason: HOSPADM

## 2022-05-27 RX ORDER — PROCHLORPERAZINE EDISYLATE 5 MG/ML
5 INJECTION INTRAMUSCULAR; INTRAVENOUS
Status: DISCONTINUED | OUTPATIENT
Start: 2022-05-27 | End: 2022-05-27 | Stop reason: HOSPADM

## 2022-05-27 RX ORDER — SODIUM CHLORIDE 0.9 % (FLUSH) 0.9 %
5-40 SYRINGE (ML) INJECTION PRN
Status: DISCONTINUED | OUTPATIENT
Start: 2022-05-27 | End: 2022-05-27 | Stop reason: HOSPADM

## 2022-05-27 RX ORDER — DIAZEPAM 5 MG/1
10 TABLET ORAL NIGHTLY
Status: DISCONTINUED | OUTPATIENT
Start: 2022-05-27 | End: 2022-05-28 | Stop reason: HOSPADM

## 2022-05-27 RX ORDER — SODIUM CHLORIDE 0.9 % (FLUSH) 0.9 %
5-40 SYRINGE (ML) INJECTION EVERY 12 HOURS SCHEDULED
Status: DISCONTINUED | OUTPATIENT
Start: 2022-05-27 | End: 2022-05-28 | Stop reason: HOSPADM

## 2022-05-27 RX ORDER — PROPRANOLOL HYDROCHLORIDE 80 MG/1
80 CAPSULE, EXTENDED RELEASE ORAL DAILY
Status: DISCONTINUED | OUTPATIENT
Start: 2022-05-28 | End: 2022-05-28 | Stop reason: HOSPADM

## 2022-05-27 RX ORDER — LABETALOL HYDROCHLORIDE 5 MG/ML
5 INJECTION, SOLUTION INTRAVENOUS
Status: DISCONTINUED | OUTPATIENT
Start: 2022-05-27 | End: 2022-05-27 | Stop reason: HOSPADM

## 2022-05-27 RX ORDER — SODIUM CHLORIDE 0.9 % (FLUSH) 0.9 %
5-40 SYRINGE (ML) INJECTION PRN
Status: DISCONTINUED | OUTPATIENT
Start: 2022-05-27 | End: 2022-05-28 | Stop reason: HOSPADM

## 2022-05-27 RX ADMIN — INDOMETHACIN 100 MG: 50 SUPPOSITORY RECTAL at 14:44

## 2022-05-27 RX ADMIN — DIAZEPAM 10 MG: 5 TABLET ORAL at 22:08

## 2022-05-27 RX ADMIN — Medication 4.5 MG: at 22:08

## 2022-05-27 RX ADMIN — PROPOFOL 70 MG: 10 INJECTION, EMULSION INTRAVENOUS at 14:47

## 2022-05-27 RX ADMIN — GLYCOPYRROLATE 0.2 MG: 0.2 INJECTION, SOLUTION INTRAMUSCULAR; INTRAVENOUS at 14:45

## 2022-05-27 RX ADMIN — HYDROMORPHONE HYDROCHLORIDE 0.5 MG: 1 INJECTION, SOLUTION INTRAMUSCULAR; INTRAVENOUS; SUBCUTANEOUS at 15:33

## 2022-05-27 RX ADMIN — GLUCAGON HYDROCHLORIDE 0.25 MG: KIT at 14:58

## 2022-05-27 RX ADMIN — SODIUM CHLORIDE: 9 INJECTION, SOLUTION INTRAVENOUS at 13:18

## 2022-05-27 RX ADMIN — PROPOFOL 85 MCG/KG/MIN: 10 INJECTION, EMULSION INTRAVENOUS at 14:49

## 2022-05-27 RX ADMIN — NAPROXEN 500 MG: 500 TABLET ORAL at 21:00

## 2022-05-27 RX ADMIN — GLUCAGON HYDROCHLORIDE 0.25 MG: KIT at 15:03

## 2022-05-27 RX ADMIN — HYDROMORPHONE HYDROCHLORIDE 0.5 MG: 1 INJECTION, SOLUTION INTRAMUSCULAR; INTRAVENOUS; SUBCUTANEOUS at 20:40

## 2022-05-27 RX ADMIN — SODIUM CHLORIDE: 9 INJECTION, SOLUTION INTRAVENOUS at 14:44

## 2022-05-27 RX ADMIN — BENZOCAINE AND MENTHOL 1 LOZENGE: 15; 3.6 LOZENGE ORAL at 19:31

## 2022-05-27 RX ADMIN — GLUCAGON HYDROCHLORIDE 0.25 MG: KIT at 14:55

## 2022-05-27 RX ADMIN — IOPAMIDOL 23 ML: 612 INJECTION, SOLUTION INTRAVENOUS at 15:20

## 2022-05-27 RX ADMIN — SODIUM CHLORIDE 3000 MG: 9 INJECTION, SOLUTION INTRAVENOUS at 13:19

## 2022-05-27 ASSESSMENT — PAIN - FUNCTIONAL ASSESSMENT
PAIN_FUNCTIONAL_ASSESSMENT: ACTIVITIES ARE NOT PREVENTED
PAIN_FUNCTIONAL_ASSESSMENT: NONE - DENIES PAIN

## 2022-05-27 ASSESSMENT — PAIN DESCRIPTION - LOCATION
LOCATION: ABDOMEN;BACK
LOCATION: BACK
LOCATION: THROAT
LOCATION: ABDOMEN
LOCATION: ABDOMEN

## 2022-05-27 ASSESSMENT — PAIN DESCRIPTION - DESCRIPTORS
DESCRIPTORS: ACHING;DISCOMFORT;DULL;SORE
DESCRIPTORS: DISCOMFORT;DULL;ACHING
DESCRIPTORS: ACHING
DESCRIPTORS: DISCOMFORT;ACHING

## 2022-05-27 ASSESSMENT — PAIN SCALES - GENERAL
PAINLEVEL_OUTOF10: 7
PAINLEVEL_OUTOF10: 2
PAINLEVEL_OUTOF10: 7
PAINLEVEL_OUTOF10: 3
PAINLEVEL_OUTOF10: 7
PAINLEVEL_OUTOF10: 4
PAINLEVEL_OUTOF10: 8

## 2022-05-27 ASSESSMENT — PAIN DESCRIPTION - PAIN TYPE: TYPE: ACUTE PAIN;CHRONIC PAIN

## 2022-05-27 ASSESSMENT — LIFESTYLE VARIABLES: SMOKING_STATUS: 0

## 2022-05-27 ASSESSMENT — PAIN DESCRIPTION - ONSET: ONSET: GRADUAL

## 2022-05-27 ASSESSMENT — PAIN DESCRIPTION - ORIENTATION
ORIENTATION: MID
ORIENTATION: RIGHT;LOWER
ORIENTATION: UPPER
ORIENTATION: RIGHT;LEFT;LOWER

## 2022-05-27 NOTE — BRIEF OP NOTE
Brief Postoperative Note    Kusum Jama  YOB: 1950  [id number]    Procedure:  ERCP    Anesthesia: [facility]      Surgeon:  Ashley Paredes MD      Findings: CBD: DILATED WITH STRICTURE AT THE PAPILLARY LEVEL.  PAPILLOTOMY WAS DONE , AREA  BRUSHED FOR CYTOLOGY AND 8X60 FULLY COVERED WALL STENT WAS PLACED WITH EXCELLENT DRAINAGE                      PD: NOT ENTERED                        Complications: None      Estimated blood loss: none        Tae Hermosillo MD

## 2022-05-27 NOTE — DISCHARGE SUMMARY
Patient name Emilie Fletcher  MRN [id number]  ADMITTED: 5/27/22 DISCHARGED: 5/28/22    ADMITTING DIAGNOSES:  1. Abdominal Pain  2. Diarrhea   3. Ampullary stenosis  4. CBD dilation    DISCHARGE DIAGNOSES:  ERCP with papillotomy and Wallstent placed  Cytology pending    DISCHARGE MEDICATIONS:  Lomotil PRN  Zyrtec 10 MG QHS  Pristiq 100 MG QD  Kenalog cream PRN  Adderall 20 MG QD  Melatonin 5 MG QHS  Valium 10 MG PRN  Flomax 0.4 MG QD  Naprosyn 500 MG BID  Propranolol 80 MG QD    DISCHARGE INSTRUCTIONS:  Patient to follow up in our office accordingly. Patient to stay hydrated, follow low fat diet. Followup as outpatient for Wallstent removal in 4-6 weeks. BRIEF HISTORY OF HOSPITALIZATION:  Thuy Sow is a 73year old male who was admitted to the hospital s/p outpatient ERCP with papillotomy and Wallstent  placement. Patient tolerating clears, will be advanced to lo fat diet. Lipase 1164 this AM, patient is asymptomatic. Patient will be discharged to home and follow up as outpatient for Wallstent removal in 4-6 weeks. Office to arrange.      Discharged per Dr. Yusef Smith, APRN - CNP NP-C 5/28/22 10:18 AM  for Alina Gamino M.D.

## 2022-05-27 NOTE — ANESTHESIA PRE PROCEDURE
Department of Anesthesiology  Preprocedure Note       Name:  Kishan Richard   Age:  73 y.o.  :  1950                                          MRN:  [id number]         Date:  2022      Surgeon: Elvin Ness):  Carleen Levy MD    Procedure: Procedure(s):  ERCP DIAGNOSTIC (RADIOLOGY NOTIFIED)    Medications prior to admission:   Prior to Admission medications    Medication Sig Start Date End Date Taking? Authorizing Provider   desvenlafaxine succinate (PRISTIQ) 100 MG TB24 extended release tablet Take by mouth daily   Yes Historical Provider, MD   amphetamine-dextroamphetamine (ADDERALL) 20 MG tablet Take 20 mg by mouth 3 times daily. Yes Historical Provider, MD   melatonin 5 mg TABS tablet Take 5 mg by mouth nightly   Yes Historical Provider, MD   diazePAM (VALIUM) 10 MG tablet Take 10 mg by mouth nightly. Yes Historical Provider, MD   naloxone 4 MG/0.1ML LIQD nasal spray 1 spray by Nasal route as needed for Opioid Reversal 21   Radhika Burgos PA-C   tamsulosin [facility]) 0.4 MG capsule Take 1 capsule by mouth daily 21   Deyanira Tran MD   triamcinolone (KENALOG) 0.1 % cream Apply topically 2 times daily.  20   Deyanira Tran MD   naproxen (NAPROSYN) 500 MG tablet Take 1 tablet by mouth 2 times daily 6/15/20   Radhika Burgos PA-C   propranolol (INDERAL LA) 80 MG extended release capsule Take 1 capsule by mouth daily 19   Deyanira Tran MD   diclofenac sodium 1 % GEL Apply 2 g topically 2 times daily 18   Deyanira Tran MD       Current medications:    Current Facility-Administered Medications   Medication Dose Route Frequency Provider Last Rate Last Admin    ampicillin-sulbactam (UNASYN) 3000 mg in 100 mL NS IVPB minibag  3,000 mg IntraVENous 60 Min Pre-Op Carleen Levy  mL/hr at 22 1319 3,000 mg at 22 1319    indomethacin (INDOCIN) 50 MG suppository 100 mg  100 mg Rectal 60 Min Pre-Op Carleen Levy MD        lactated ringers infusion 1,000 mL  1,000 mL IntraVENous Continuous Ishmael Randall MD        0.9 % sodium chloride infusion   IntraVENous Continuous Ishmael Randall MD 50 mL/hr at 05/27/22 1318 New Bag at 05/27/22 1318    0.9 % sodium chloride infusion  25 mL IntraVENous PRN Ishmael Randall MD        sodium chloride flush 0.9 % injection 5-40 mL  5-40 mL IntraVENous 2 times per day Ishmael Randall MD        sodium chloride flush 0.9 % injection 5-40 mL  5-40 mL IntraVENous PRN Ishmael Randall MD           Allergies:  No Known Allergies    Problem List:    Patient Active Problem List   Diagnosis Code    Osteoarthritis of left knee M17.12    DDD (degenerative disc disease) [id number]    Discitis M46.40    Cataract H26.9    Trauma T14.90XA    Multiple closed fractures of ribs of left side S22.42XA    Hemothorax on left J94.2       Past Medical History:        Diagnosis Date    Arthritis     OSTEO    Back problem     lumbar disc with sciatic pain down lt leg;    Depression     ADHD; bi-polar    Enlarged gallbladder     Essential tremor     Fatty liver     Hyperlipidemia     Irritable bowel syndrome     Osteoarthritis     Reflux esophagitis     Tremor     takes inderal for this    Vitamin D deficiency        Past Surgical History:        Procedure Laterality Date    BONE BIOPSY  12/2204553    percutaneous boipsy L5 S1 and interspace biopsy    CARPAL TUNNEL RELEASE      bilat    CATARACT REMOVAL WITH IMPLANT Left 7/16/14    CATARACT REMOVAL WITH IMPLANT Right 8/14/2014    COLONOSCOPY  11/07/2014    JOINT REPLACEMENT  2012    total left knee    KNEE ARTHROSCOPY      right    OTHER SURGICAL HISTORY      lt ulnar nerve     SHOULDER ARTHROSCOPY Bilateral      for rt & lt arthritis     VITRECTOMY Left 12/18/2013       Social History:    Social History     Tobacco Use    Smoking status: Former Smoker     Packs/day: 1.00     Years: 10.00     Pack years: 10.00    Smokeless tobacco: Never Used   Substance Use Topics    Alcohol use: Yes     Alcohol/week: 14.0 standard drinks     Types: 14 Cans of beer per week                                Counseling given: Not Answered      Vital Signs (Current):   Vitals:    05/24/22 1551 05/27/22 1255   BP:  (!) 173/74   Pulse:  77   Resp:  15   Temp:  97.5 °F (36.4 °C)   TempSrc:  Temporal   SpO2:  98%   Weight: 160 lb (72.6 kg) 160 lb (72.6 kg)   Height: 5' 6\" (1.676 m) 5' 6\" (1.676 m)                                              BP Readings from Last 3 Encounters:   05/27/22 (!) 173/74   11/18/19 110/70   09/30/19 (!) 120/59       NPO Status: Time of last liquid consumption: 2200                        Time of last solid consumption: 2200                        Date of last liquid consumption: 05/26/22                        Date of last solid food consumption: 05/26/22    BMI:   Wt Readings from Last 3 Encounters:   05/27/22 160 lb (72.6 kg)   11/18/19 162 lb (73.5 kg)   09/29/19 160 lb (72.6 kg)     Body mass index is 25.82 kg/m². CBC:   Lab Results   Component Value Date    WBC 8.1 05/27/2022    RBC 4.49 05/27/2022    HGB 15.0 05/27/2022    HCT 44.8 05/27/2022    MCV 99.8 05/27/2022    RDW 12.0 05/27/2022     05/27/2022       CMP:   Lab Results   Component Value Date     09/30/2019    K 4.1 09/30/2019     09/30/2019    CO2 27 09/30/2019    BUN 11 09/30/2019    CREATININE 0.8 09/30/2019    GFRAA >60 09/30/2019    LABGLOM >60 09/30/2019    GLUCOSE 109 09/30/2019    PROT 6.2 09/29/2019    CALCIUM 8.2 09/30/2019    BILITOT 0.8 09/29/2019    ALKPHOS 111 09/29/2019    AST 63 09/29/2019    ALT 31 09/29/2019       POC Tests: No results for input(s): POCGLU, POCNA, POCK, POCCL, POCBUN, POCHEMO, POCHCT in the last 72 hours.     Coags:   Lab Results   Component Value Date    PROTIME 10.2 05/27/2022    PROTIME 14.2 04/30/2012    INR 0.9 05/27/2022       HCG (If Applicable): No results found for: PREGTESTUR, PREGSERUM, HCG, HCGQUANT     ABGs: No results found for: PHART, PO2ART, OFS9NKT, PRD4UJQ, BEART, S7EQXFKS     Type & Screen (If Applicable):  No results found for: LABABO, LABRH    Drug/Infectious Status (If Applicable):  No results found for: HIV, HEPCAB    COVID-19 Screening (If Applicable): No results found for: COVID19        Anesthesia Evaluation  Patient summary reviewed and Nursing notes reviewed no history of anesthetic complications:   Airway: Mallampati: III          Dental:          Pulmonary: breath sounds clear to auscultation      (-) not a current smoker          Patient did not smoke on day of surgery. ROS comment: Left hemothorax   Cardiovascular:  Exercise tolerance: good (>4 METS),   (+) hyperlipidemia        Rhythm: regular  Rate: normal           Beta Blocker:  Dose within 24 Hrs         Neuro/Psych:   (+) psychiatric history (ADHD):depression/anxiety  (Bipolar)             ROS comment: Tremor - managed with Inderal GI/Hepatic/Renal:   (+) GERD (Reflux esophagitis): no interval change, liver disease:,          ROS comment: IBS  Colon polyps  Abdominal pain  Diarrhea  Hx ETOH abuse. Endo/Other:    (+) : arthritis (OA, DJD, DDD, sciatica, and chronic pain): OA., . Pt had no PAT visit       Abdominal:             Vascular: negative vascular ROS. Other Findings:           Anesthesia Plan      MAC     ASA 3       Induction: intravenous. MIPS: Postoperative opioids intended and Prophylactic antiemetics administered. Anesthetic plan and risks discussed with patient. Plan discussed with CRNA. Yanick Parrish DO   5/27/2022      History, data, and pertinent studies from chart review. Above represents information available via the shared medical record including previous anesthetic, medication, and allergy history.   Confirmation of above and final disposition per DOS anesthesiologist.    Yanick Parrish DO  Staff Anesthesiologist  May 27, 2022  2:13 PM  Chart reviewed, patient seen and agree with above evaluation.   Duyen Leavitt, APRN - CRNA

## 2022-05-28 VITALS
OXYGEN SATURATION: 97 % | HEIGHT: 66 IN | SYSTOLIC BLOOD PRESSURE: 106 MMHG | HEART RATE: 50 BPM | RESPIRATION RATE: 16 BRPM | TEMPERATURE: 97.8 F | DIASTOLIC BLOOD PRESSURE: 53 MMHG | WEIGHT: 169.6 LBS | BODY MASS INDEX: 27.26 KG/M2

## 2022-05-28 LAB
ALBUMIN SERPL-MCNC: 2.8 G/DL (ref 3.5–5.2)
ALP BLD-CCNC: 94 U/L (ref 40–129)
ALT SERPL-CCNC: 25 U/L (ref 0–40)
AST SERPL-CCNC: 41 U/L (ref 0–39)
BILIRUB SERPL-MCNC: 0.5 MG/DL (ref 0–1.2)
BILIRUBIN DIRECT: <0.2 MG/DL (ref 0–0.3)
BILIRUBIN, INDIRECT: ABNORMAL MG/DL (ref 0–1)
HCT VFR BLD CALC: 37.2 % (ref 37–54)
HEMOGLOBIN: 12.4 G/DL (ref 12.5–16.5)
LIPASE: 1164 U/L (ref 13–60)
MCH RBC QN AUTO: 32.9 PG (ref 26–35)
MCHC RBC AUTO-ENTMCNC: 33.3 % (ref 32–34.5)
MCV RBC AUTO: 98.7 FL (ref 80–99.9)
PDW BLD-RTO: 11.9 FL (ref 11.5–15)
PLATELET # BLD: 125 E9/L (ref 130–450)
PMV BLD AUTO: 11.6 FL (ref 7–12)
RBC # BLD: 3.77 E12/L (ref 3.8–5.8)
TOTAL PROTEIN: 5.2 G/DL (ref 6.4–8.3)
WBC # BLD: 5.9 E9/L (ref 4.5–11.5)

## 2022-05-28 PROCEDURE — 80076 HEPATIC FUNCTION PANEL: CPT

## 2022-05-28 PROCEDURE — 85027 COMPLETE CBC AUTOMATED: CPT

## 2022-05-28 PROCEDURE — 6370000000 HC RX 637 (ALT 250 FOR IP): Performed by: INTERNAL MEDICINE

## 2022-05-28 PROCEDURE — 36415 COLL VENOUS BLD VENIPUNCTURE: CPT

## 2022-05-28 PROCEDURE — 2580000003 HC RX 258: Performed by: INTERNAL MEDICINE

## 2022-05-28 PROCEDURE — 6360000002 HC RX W HCPCS: Performed by: INTERNAL MEDICINE

## 2022-05-28 PROCEDURE — 83690 ASSAY OF LIPASE: CPT

## 2022-05-28 PROCEDURE — G0378 HOSPITAL OBSERVATION PER HR: HCPCS

## 2022-05-28 RX ADMIN — SODIUM CHLORIDE: 9 INJECTION, SOLUTION INTRAVENOUS at 01:27

## 2022-05-28 RX ADMIN — NAPROXEN 500 MG: 500 TABLET ORAL at 08:48

## 2022-05-28 RX ADMIN — HYDROMORPHONE HYDROCHLORIDE 0.5 MG: 1 INJECTION, SOLUTION INTRAMUSCULAR; INTRAVENOUS; SUBCUTANEOUS at 01:25

## 2022-05-28 RX ADMIN — TAMSULOSIN HYDROCHLORIDE 0.4 MG: 0.4 CAPSULE ORAL at 08:48

## 2022-05-28 ASSESSMENT — PAIN DESCRIPTION - LOCATION: LOCATION: BACK

## 2022-05-28 ASSESSMENT — PAIN SCALES - GENERAL
PAINLEVEL_OUTOF10: 7
PAINLEVEL_OUTOF10: 0

## 2022-05-28 NOTE — OP NOTE
[address] [address]                                OPERATIVE REPORT    PATIENT NAME: Dana Velazquez                     :        1950  MED REC NO:   [id number]                            ROOM:       0515  ACCOUNT NO:   [de-identified]                           ADMIT DATE: 2022  PROVIDER:     Germán Myers MD    DATE OF PROCEDURE:  2022    PROCEDURE PERFORMED:  ERCP with papillotomy, brushing, and Wallstent  placement. PREOPERATIVE DIAGNOSES:  Abnormal liver enzymes and dilated biliary  tree. POSTOPERATIVE DIAGNOSES:  Common bile duct dilatation with a stenotic  distal and intrapapillary portion. Papillotomy was done. Papilla  appeared meaty and thick. Strictured area was brushed for cytology and  an 8-[place] x 60 mm fully-covered Wallstent was placed across the  strictured area with excellent drainage obtained. SURGEON:  Germán Myers MD.    ANESTHESIA:  LMAC. DESCRIPTION OF PROCEDURE:  With the patient in his left lateral  decubitus position, the Olympus side-viewing video duodenoscope was  introduced into the esophagus, advanced through the GE junction and into  the gastric body, advanced through the pylorus into the duodenal bulb  and second portion of the duodenum, where papilla was visualized and  positioned at 12 o'clock position. Using a papillotome, common bile  duct was cannulated and deep cannulation was obtained using a guidewire. Cholangiography showed dilated common bile duct with strictured distal  portion and into the intrapapillary area. Over the guidewire, the  papillotome was adjusted and an adequately sized papillotomy was  performed, albeit slowly secondary to a very thick, meaty papilla. After the papillotomy was done, brushing for cytology was carried out  from that area.   Over the guidewire, the brush was then exchanged with  #8-[place] x 60 mm fully-covered Wallstent, which was placed without  difficulty. Excellent drainage was obtained. Intrahepatic duct also  was dilated.   Pancreatic duct was not cannulated during this exam.        Katerin Queen MD    D: 05/27/2022 15:35:10       T: 05/27/2022 15:38:44     [email or web address]  Job#: [id number]     Doc#: [id number]    CC:  MD Misti Keating MD

## 2022-05-31 NOTE — H&P
Gastroenterology      Pre-operative History and Physical      HISTORY OF PRESENT ILLNESS:   Jamarcus Rodriguez is a 73year old  male patient who is seen at the request of Darian Bedolla for a consultation/initial visit. Patient presents today with abnormal CT/MRI reports. Patient states \"I want my gallbladder out\". States he is having \"horrible pain in my stomach\". Anatomy of GB, pancreas, liver and bile ducts explained to the patient, all questions answered. States diarrhea is what started al of this work up. Diarrhea started over 1 year ago. Using Imodium & Lomotil daily, but again diarrhea is still present. Going 6-8 times a day. Stools are loose, with pita appearance. Reports he just had labs completed recently, will try to obtain. States his weight is fluctuating  few pounds at times. Denies any drastic weight loss recently. POC reviewed with the patient including the need for a ERCP, all questions answered. Patient also educated with the risk of pancreatitis with this procedure, will be kept over night for hospitalization to [facility] for pancreatitis. Just had a colonoscopy with Dr. Tena Hdz, \"couple polyps removed\".    Patient denies melena, hematochezia, hematemesis    HISTORY:   Past Medical History:   Diagnosis Date    Arthritis     OSTEO    Back problem     lumbar disc with sciatic pain down lt leg;    Depression     ADHD; bi-polar    Enlarged gallbladder     Essential tremor     Fatty liver     Hyperlipidemia     Irritable bowel syndrome     Osteoarthritis     Reflux esophagitis     Tremor     takes inderal for this    Vitamin D deficiency        PERTINENT FAMILY HISTORY:    Family History   Problem Relation Age of Onset    Coronary Art Dis Mother        MEDICATIONS:    Current Outpatient Medications:     diphenoxylate-atropine (LOMOTIL) 2.5-0.025 MG per tablet, Take 2 tablets by mouth daily as needed for Diarrhea., Disp: , Rfl:     oxyCODONE (OXY-IR) 30 MG immediate release tablet, Take 30 mg by mouth every 6 hours as needed for Pain., Disp: , Rfl:     cetirizine (ZYRTEC) 10 MG tablet, Take 10 mg by mouth daily, Disp: , Rfl:     desvenlafaxine succinate (PRISTIQ) 100 MG TB24 extended release tablet, Take by mouth daily, Disp: , Rfl:     amphetamine-dextroamphetamine (ADDERALL) 20 MG tablet, Take 20 mg by mouth 3 times daily. , Disp: , Rfl:     melatonin 5 mg TABS tablet, Take 10 mg by mouth nightly , Disp: , Rfl:     diazePAM (VALIUM) 10 MG tablet, Take 10 mg by mouth nightly as needed. , Disp: , Rfl:     naloxone 4 MG/0.1ML LIQD nasal spray, 1 spray by Nasal route as needed for Opioid Reversal, Disp: 1 each, Rfl: 5    tamsulosin (FLOMAX) 0.4 MG capsule, Take 1 capsule by mouth daily, Disp: 90 capsule, Rfl: 3    triamcinolone (KENALOG) 0.1 % cream, Apply topically 2 times daily. , Disp: 15 g, Rfl: 1    naproxen (NAPROSYN) 500 MG tablet, Take 1 tablet by mouth 2 times daily (Patient taking differently: Take 250 mg by mouth 2 times daily ), Disp: 60 tablet, Rfl: 5    propranolol (INDERAL LA) 80 MG extended release capsule, Take 1 capsule by mouth daily, Disp: 180 capsule, Rfl: 1    diclofenac sodium 1 % GEL, Apply 2 g topically 2 times daily, Disp: 1 Tube, Rfl: 1    ALLERGIES:  Patient has no known allergies. PHYSICAL EXAM/GENERAL APPEARANCE:   Constitutional:  Appearance: well-developed, appropriate grooming, in no acute distress. .  Communication: conversation appropriate. Skin:  Inspection: no rashes, ulcers, icterus or other lesions. Eyes:  Conjunctivae/lids: lids normal,anicteric sclerae, moist conjunctivae. Pupils/irises: PERRLA. ENMT:  External: normal external inspection of the nose and ears, atraumautic. Hearing: within normal limits. Oropharynx: normal tongue, hard and soft palate; posterior pharynx without erythema, exudate or lesions. Neck:  Neck: normal motion and central trachea.   Thyroid: normal size, consistency and position; no masses or tenderness. Respiratory:  Effort: normal respiratory effort and no intercostal retractions. Auscultation: normal breath sounds, no rubs, wheezes or rhonchi. Chest:  Inspection: symetrical without visualized masses. Cardiovascular:  Palpation: normal size,PMI is palpable in the 5th intercostal space, left midclavicular line,normal rythym. Auscultation: normal, S1 and S2,no gallops,no rubs or murmurs. Gastrointestinal/Abdomen:  Abdomen: normal consistency, no tenderness or masses,normal bowel sounds. Liver/Spleen: normal,normal size,not palpable. Extremities:  RLE: no cyanosis, clubbing, or edema. Normal peripheral pulses. Quirino Jerome LLE: no cyanosis, clubbing or edema. Normal peripheral pulses. Quirino Jerome Psychiatric:  Orientation: oriented to time, space and person.     OTHER SIGNIFICANT FINDINGS: None    IMPRESSION/INITIAL DIAGNOSIS:   Ampullary stenosis  CBD dilation  Fatty liver  Diarrhea  Abdominal pain  Colon polyps          Electronically signed by Bryce Sellers MD on 5/31/2022 at 8:29 AM

## 2022-07-07 NOTE — PROGRESS NOTES
[facility] PRE-ADMISSION TESTING INSTRUCTIONS    The Preadmission Testing patient is instructed accordingly using the following criteria (check applicable):    ARRIVAL INSTRUCTIONS:  [x] Parking the day of Surgery is located in the Main Entrance lot. Upon entering the door, make an immediate right to the surgery reception desk    [x] Bring photo ID and insurance card    [] Bring in a copy of Living will or Durable Power of  papers. [x] Please be sure to arrange for responsible adult to provide transportation to and from the hospital    [x] Please arrange for responsible adult to be with you for the 24 hour period post procedure due to having anesthesia    [x] If you awake am of surgery not feeling well or have temperature >100 please call [phone]    GENERAL INSTRUCTIONS:    [x] Nothing by mouth after midnight, including gum, candy, mints or water    [x] You may brush your teeth, but do not swallow any water    [x] Take medications as instructed with 1-2 oz of water    [x] Stop herbal supplements and vitamins 5 days prior to procedure    [x] Follow preop dosing of blood thinners per physician instructions    [] Take 1/2 dose of evening insulin, but no insulin after midnight    [] No oral diabetic medications after midnight    [] If diabetic and have low blood sugar or feel symptomatic, take 1-2oz apple juice only    [] Bring inhalers day of surgery    [] Bring C-PAP/ Bi-Pap day of surgery    [] Bring urine specimen day of surgery    [x] Shower or bath with soap, lather and rinse well, AM of Surgery, no lotion, powders or creams to surgical site    [] Follow bowel prep as instructed per surgeon    [x] No tobacco products within 24 hours of surgery     [x] No alcohol or illegal drug use within 24 hours of surgery.     [x] Jewelry, body piercing's, eyeglasses, contact lenses and dentures are not permitted into surgery (bring cases)      [] Please do not wear any nail polish, make up or hair products on the day of surgery    [x] You can expect a call the business day prior to procedure to notify you if your arrival time changes    [x] If you receive a survey after surgery we would greatly appreciate your comments    [] Parent/guardian of a minor must accompany their child and remain on the premises  the entire time they are under our care     [] Pediatric patients may bring favorite toy, blanket or comfort item with them    [] A caregiver or family member must remain with the patient during their stay if they are mentally handicapped, have dementia, disoriented or unable to use a call light or would be a safety concern if left unattended    [x] Please notify surgeon if you develop any illness between now and time of surgery (cold, cough, sore throat, fever, nausea, vomiting) or any signs of infections  including skin, wounds, and dental.    [x]  The Outpatient Pharmacy is available to fill your prescription here on your day of surgery, ask your preop nurse for details    [] Other instructions    EDUCATIONAL MATERIALS PROVIDED:    [] PAT Preoperative Education Packet/Booklet     [] Medication List    [] Transfusion bracelet applied with instructions    [] Shower with soap, lather and rinse well, and use CHG wipes provided the evening before surgery as instructed    [] Incentive spirometer with instructions

## 2022-07-08 ENCOUNTER — PREP FOR PROCEDURE (OUTPATIENT)
Dept: GASTROENTEROLOGY | Age: 72
End: 2022-07-08

## 2022-07-08 RX ORDER — SODIUM CHLORIDE 9 MG/ML
INJECTION, SOLUTION INTRAVENOUS CONTINUOUS
Status: CANCELLED | OUTPATIENT
Start: 2022-07-08

## 2022-07-08 RX ORDER — SODIUM CHLORIDE 9 MG/ML
25 INJECTION, SOLUTION INTRAVENOUS PRN
Status: CANCELLED | OUTPATIENT
Start: 2022-07-08

## 2022-07-08 RX ORDER — SODIUM CHLORIDE 0.9 % (FLUSH) 0.9 %
5-40 SYRINGE (ML) INJECTION PRN
Status: CANCELLED | OUTPATIENT
Start: 2022-07-08

## 2022-07-08 RX ORDER — SODIUM CHLORIDE 0.9 % (FLUSH) 0.9 %
5-40 SYRINGE (ML) INJECTION EVERY 12 HOURS SCHEDULED
Status: CANCELLED | OUTPATIENT
Start: 2022-07-08

## 2022-07-08 NOTE — H&P
Gastroenterology      Pre-operative History and Physical      HISTORY OF PRESENT ILLNESS:   Lawanda Monge is a 73year old male of Dr Pretty Jarrett seen in consultation 5/25/22 for ERCP. Patient pres with abnormal CT/MRI reports. Pt reported abdominal pain. Anatomy of GB, pancreas, liver and bile ducts explained to the patient, all questions answered. States diarrhea is what started his work up. Diarrhea started over 1 year ago. Using Imodium & Lomotil daily without resolve. Pt having 6-8 bouts a day, loose and with pita appearance. States his weight is fluctuating  few pounds at times. Denies any drastic weight loss recently. States he had a colonoscopy with Dr. Alicia Carrel, \"couple polyps removed\". Surgical pathology in [facility] 4/28/22 - A.  Duodenum, biopsy: Duodenal mucosa with features of chronic peptic duodenitis. Architecture is intact with appropriate villous to crypt length ratios. Negative for increased intraepithelial lymphocytes. B. Terminal ileum, biopsy: Fragments of terminal ileal mucosa with no significant pathologic findings. Prominent intramucosal lymphoid aggregates present, some with reactive germinal centers. Architecture is intact with appropriate villous to crypt length ratios. Albertus Clos, random biopsy: Multiple fragments of colonic mucosa with no significant pathologic findings. Prior Pathology 11/7/14 in [facility] - Colon, cecum, biopsy:  Adenomatous polyp (tubular adenoma). Patient denies melena, hematochezia, hematemesis. Pt was admitted to the hospital s/p outpatient ERCP with papillotomy, brushing (negative for malignant cells), and Wallstent placement 5/27/22 which demonstrated common bile duct dilatation with a stenotic distal and intrapapillary portion. Papillotomy was done. Papilla appeared meaty and thick. Strictured area was brushed for cytology and an 8-Georgian x 60 mm fully-covered Wallstent was placed across the strictured area with excellent drainage obtained.  Patient tolerating clears, will be advanced to lo fat diet. Lipase 1164 upon discharge, patient asymptomatic. Patient was discharged to home in stable condition and [facility] HISTORY:   Past Medical History:   Diagnosis Date    Arthritis     OSTEO    Back problem     lumbar disc with sciatic pain down lt leg;    Depression     ADHD; bi-polar    Enlarged gallbladder     Essential tremor     Fatty liver     Hyperlipidemia     Irritable bowel syndrome     Osteoarthritis     Reflux esophagitis     Vitamin D deficiency        PERTINENT FAMILY HISTORY:    Family History   Problem Relation Age of Onset    Coronary Art Dis Mother        MEDICATIONS:    Current Outpatient Medications:     diphenoxylate-atropine (LOMOTIL) 2.5-0.025 MG per tablet, Take 2 tablets by mouth daily as needed for Diarrhea., Disp: , Rfl:     oxyCODONE (OXY-IR) 30 MG immediate release tablet, Take 30 mg by mouth every 6 hours as needed for Pain., Disp: , Rfl:     cetirizine (ZYRTEC) 10 MG tablet, Take 10 mg by mouth daily, Disp: , Rfl:     amphetamine-dextroamphetamine (ADDERALL) 20 MG tablet, Take 20 mg by mouth 3 times daily. , Disp: , Rfl:     melatonin 5 mg TABS tablet, Take 10 mg by mouth nightly , Disp: , Rfl:     diazePAM (VALIUM) 10 MG tablet, Take 10 mg by mouth nightly as needed. , Disp: , Rfl:     naloxone 4 MG/0.1ML LIQD nasal spray, 1 spray by Nasal route as needed for Opioid Reversal, Disp: 1 each, Rfl: 5    tamsulosin (FLOMAX) 0.4 MG capsule, Take 1 capsule by mouth daily, Disp: 90 capsule, Rfl: 3    triamcinolone (KENALOG) 0.1 % cream, Apply topically 2 times daily. , Disp: 15 g, Rfl: 1    naproxen (NAPROSYN) 500 MG tablet, Take 1 tablet by mouth 2 times daily (Patient taking differently: Take 250 mg by mouth 2 times daily ), Disp: 60 tablet, Rfl: 5    propranolol (INDERAL LA) 80 MG extended release capsule, Take 1 capsule by mouth daily, Disp: 180 capsule, Rfl: 1    diclofenac sodium 1 % GEL, Apply 2 g topically 2 times daily, Disp: 1 Tube, Rfl: 1    ALLERGIES:  Patient has no known allergies. PHYSICAL EXAM/GENERAL APPEARANCE:     Vital Signs:  BP  (mmHg)  Pulse  (bpm) Weight (lbs/oz) Height (ft/in) BMI Temp  146/122 77 Weight not taken: Patient Refused Treatment 5 / 7  97.9 (F)  Physical Exam:  Constitutional:  Appearance: well-developed, appropriate grooming, in no acute distress, in wheelchair . [facility] Communication: conversation appropriate. Skin:  Inspection: no rashes, ulcers, icterus or other lesions. Eyes:  Conjunctivae/lids: lids normal,anicteric sclerae, moist conjunctivae. Pupils/irises: PERRLA. ENMT:  External: normal external inspection of the nose and ears, atraumautic. Hearing: within normal limits. Oropharynx: normal tongue, hard and soft palate; posterior pharynx without erythema, exudate or lesions. Neck:  Neck: normal motion and central trachea. Thyroid: normal size, consistency and position; no masses or tenderness. Respiratory:  Effort: normal respiratory effort and no intercostal retractions. Auscultation: normal breath sounds, no rubs, wheezes or rhonchi. Chest:  Inspection: symetrical without visualized masses. Cardiovascular:  Palpation: normal size,PMI is palpable in the 5th intercostal space, left midclavicular line,normal rythym. Auscultation: normal, S1 and S2,no gallops,no rubs or murmurs. Gastrointestinal/Abdomen:  Abdomen: normal consistency, no tenderness or masses,normal bowel sounds. Liver/Spleen: normal,normal size,not palpable. Extremities:  RLE: no edema. LLE: no edema. Psychiatric:  Orientation: oriented to time, space and person. OTHER SIGNIFICANT FINDINGS: None    IMPRESSION/INITIAL DIAGNOSIS:   S/P ERCP with brushing (negative malignant cells) and wallstent placement 5/27/22 - Common bile duct dilatation with a stenotic distal and intrapapillary portion. Papillotomy was done. Papilla appeared meaty and thick.   Strictured area was brushed for cytology and an 8-Serbian x 60 mm fully-covered Wallstent was placed across the strictured area with excellent drainage obtained.   Ampullary stenosis  CBD dilation  Fatty liver  Diarrhea  Abdominal pain  Colon polyps    PLAN:  ERCP with stone and stent removal     Electronically signed by Dennie Salen, MD on 7/8/2022 at 11:58 AM

## 2022-07-11 ENCOUNTER — ANESTHESIA EVENT (OUTPATIENT)
Dept: ENDOSCOPY | Age: 72
End: 2022-07-11
Payer: COMMERCIAL

## 2022-07-11 ENCOUNTER — ANESTHESIA (OUTPATIENT)
Dept: ENDOSCOPY | Age: 72
End: 2022-07-11
Payer: COMMERCIAL

## 2022-07-11 ENCOUNTER — APPOINTMENT (OUTPATIENT)
Dept: GENERAL RADIOLOGY | Age: 72
End: 2022-07-11
Attending: INTERNAL MEDICINE
Payer: COMMERCIAL

## 2022-07-11 ENCOUNTER — HOSPITAL ENCOUNTER (OUTPATIENT)
Age: 72
Setting detail: OUTPATIENT SURGERY
Discharge: HOME | End: 2022-07-11
Attending: INTERNAL MEDICINE | Admitting: INTERNAL MEDICINE
Payer: COMMERCIAL

## 2022-07-11 VITALS
OXYGEN SATURATION: 98 % | RESPIRATION RATE: 16 BRPM | HEART RATE: 58 BPM | HEIGHT: 66 IN | WEIGHT: 170 LBS | DIASTOLIC BLOOD PRESSURE: 66 MMHG | BODY MASS INDEX: 27.32 KG/M2 | TEMPERATURE: 96.8 F | SYSTOLIC BLOOD PRESSURE: 118 MMHG

## 2022-07-11 LAB
AMYLASE: 33 U/L (ref 20–100)
BASOPHILS ABSOLUTE: 0.04 E9/L (ref 0–0.2)
BASOPHILS RELATIVE PERCENT: 0.5 % (ref 0–2)
EOSINOPHILS ABSOLUTE: 0.18 E9/L (ref 0.05–0.5)
EOSINOPHILS RELATIVE PERCENT: 2.3 % (ref 0–6)
HCT VFR BLD CALC: 42.2 % (ref 37–54)
HEMOGLOBIN: 13.8 G/DL (ref 12.5–16.5)
IMMATURE GRANULOCYTES #: 0.02 E9/L
IMMATURE GRANULOCYTES %: 0.3 % (ref 0–5)
LIPASE: 33 U/L (ref 13–60)
LYMPHOCYTES ABSOLUTE: 2.26 E9/L (ref 1.5–4)
LYMPHOCYTES RELATIVE PERCENT: 28.5 % (ref 20–42)
MCH RBC QN AUTO: 32.6 PG (ref 26–35)
MCHC RBC AUTO-ENTMCNC: 32.7 % (ref 32–34.5)
MCV RBC AUTO: 99.8 FL (ref 80–99.9)
MONOCYTES ABSOLUTE: 0.87 E9/L (ref 0.1–0.95)
MONOCYTES RELATIVE PERCENT: 11 % (ref 2–12)
NEUTROPHILS ABSOLUTE: 4.56 E9/L (ref 1.8–7.3)
NEUTROPHILS RELATIVE PERCENT: 57.4 % (ref 43–80)
PDW BLD-RTO: 12.1 FL (ref 11.5–15)
PLATELET # BLD: 162 E9/L (ref 130–450)
PMV BLD AUTO: 10.9 FL (ref 7–12)
RBC # BLD: 4.23 E12/L (ref 3.8–5.8)
WBC # BLD: 7.9 E9/L (ref 4.5–11.5)

## 2022-07-11 PROCEDURE — 3700000001 HC ADD 15 MINUTES (ANESTHESIA): Performed by: INTERNAL MEDICINE

## 2022-07-11 PROCEDURE — 3609015200 HC ERCP REMOVE CALCULI/DEBRIS BILIARY/PANCREAS DUCT: Performed by: INTERNAL MEDICINE

## 2022-07-11 PROCEDURE — C1769 GUIDE WIRE: HCPCS | Performed by: INTERNAL MEDICINE

## 2022-07-11 PROCEDURE — 74330 X-RAY BILE/PANC ENDOSCOPY: CPT

## 2022-07-11 PROCEDURE — 2720000010 HC SURG SUPPLY STERILE: Performed by: INTERNAL MEDICINE

## 2022-07-11 PROCEDURE — 85025 COMPLETE CBC W/AUTO DIFF WBC: CPT

## 2022-07-11 PROCEDURE — 82150 ASSAY OF AMYLASE: CPT

## 2022-07-11 PROCEDURE — 3700000000 HC ANESTHESIA ATTENDED CARE: Performed by: INTERNAL MEDICINE

## 2022-07-11 PROCEDURE — 7100000010 HC PHASE II RECOVERY - FIRST 15 MIN: Performed by: INTERNAL MEDICINE

## 2022-07-11 PROCEDURE — 6360000002 HC RX W HCPCS: Performed by: NURSE ANESTHETIST, CERTIFIED REGISTERED

## 2022-07-11 PROCEDURE — 2580000003 HC RX 258: Performed by: NURSE ANESTHETIST, CERTIFIED REGISTERED

## 2022-07-11 PROCEDURE — 36415 COLL VENOUS BLD VENIPUNCTURE: CPT

## 2022-07-11 PROCEDURE — 2709999900 HC NON-CHARGEABLE SUPPLY: Performed by: INTERNAL MEDICINE

## 2022-07-11 PROCEDURE — 83690 ASSAY OF LIPASE: CPT

## 2022-07-11 PROCEDURE — 7100000011 HC PHASE II RECOVERY - ADDTL 15 MIN: Performed by: INTERNAL MEDICINE

## 2022-07-11 PROCEDURE — 6360000004 HC RX CONTRAST MEDICATION: Performed by: INTERNAL MEDICINE

## 2022-07-11 RX ORDER — SODIUM CHLORIDE 9 MG/ML
25 INJECTION, SOLUTION INTRAVENOUS PRN
Status: DISCONTINUED | OUTPATIENT
Start: 2022-07-11 | End: 2022-07-11 | Stop reason: HOSPADM

## 2022-07-11 RX ORDER — PROPOFOL 10 MG/ML
INJECTION, EMULSION INTRAVENOUS CONTINUOUS PRN
Status: DISCONTINUED | OUTPATIENT
Start: 2022-07-11 | End: 2022-07-11 | Stop reason: SDUPTHER

## 2022-07-11 RX ORDER — SODIUM CHLORIDE 9 MG/ML
INJECTION, SOLUTION INTRAVENOUS CONTINUOUS
Status: DISCONTINUED | OUTPATIENT
Start: 2022-07-11 | End: 2022-07-11 | Stop reason: HOSPADM

## 2022-07-11 RX ORDER — ONDANSETRON 2 MG/ML
INJECTION INTRAMUSCULAR; INTRAVENOUS PRN
Status: DISCONTINUED | OUTPATIENT
Start: 2022-07-11 | End: 2022-07-11 | Stop reason: SDUPTHER

## 2022-07-11 RX ORDER — SODIUM CHLORIDE 9 MG/ML
INJECTION, SOLUTION INTRAVENOUS CONTINUOUS PRN
Status: DISCONTINUED | OUTPATIENT
Start: 2022-07-11 | End: 2022-07-11 | Stop reason: SDUPTHER

## 2022-07-11 RX ORDER — SODIUM CHLORIDE 0.9 % (FLUSH) 0.9 %
5-40 SYRINGE (ML) INJECTION EVERY 12 HOURS SCHEDULED
Status: DISCONTINUED | OUTPATIENT
Start: 2022-07-11 | End: 2022-07-11 | Stop reason: HOSPADM

## 2022-07-11 RX ORDER — SODIUM CHLORIDE 0.9 % (FLUSH) 0.9 %
5-40 SYRINGE (ML) INJECTION PRN
Status: DISCONTINUED | OUTPATIENT
Start: 2022-07-11 | End: 2022-07-11 | Stop reason: HOSPADM

## 2022-07-11 RX ADMIN — SODIUM CHLORIDE: 9 INJECTION, SOLUTION INTRAVENOUS at 13:52

## 2022-07-11 RX ADMIN — PROPOFOL 100 MCG/KG/MIN: 10 INJECTION, EMULSION INTRAVENOUS at 14:00

## 2022-07-11 RX ADMIN — IOPAMIDOL 13 ML: 612 INJECTION, SOLUTION INTRAVENOUS at 14:19

## 2022-07-11 RX ADMIN — ONDANSETRON 4 MG: 2 INJECTION INTRAMUSCULAR; INTRAVENOUS at 13:57

## 2022-07-11 ASSESSMENT — LIFESTYLE VARIABLES: SMOKING_STATUS: 1

## 2022-07-11 ASSESSMENT — PAIN SCALES - GENERAL
PAINLEVEL_OUTOF10: 0
PAINLEVEL_OUTOF10: 0

## 2022-07-11 ASSESSMENT — ENCOUNTER SYMPTOMS: DYSPNEA ACTIVITY LEVEL: NO INTERVAL CHANGE

## 2022-07-11 NOTE — ANESTHESIA PRE PROCEDURE
Department of Anesthesiology  Preprocedure Note       Name:  La Reid   Age:  81 y.o.  :  1950                                          MRN:  [id number]         Date:  2022      Surgeon: Job Obando):  Alice Ambrosio MD    Procedure: Procedure(s):  ERCP STENT REMOVAL (RADIOLOGY NOTIFIED)    Medications prior to admission:   Prior to Admission medications    Medication Sig Start Date End Date Taking? Authorizing Provider   diphenoxylate-atropine (LOMOTIL) 2.5-0.025 MG per tablet Take 2 tablets by mouth daily as needed for Diarrhea. Historical Provider, MD   oxyCODONE (OXY-IR) 30 MG immediate release tablet Take 30 mg by mouth every 6 hours as needed for Pain. Historical Provider, MD   cetirizine (ZYRTEC) 10 MG tablet Take 10 mg by mouth daily    Historical Provider, MD   amphetamine-dextroamphetamine (ADDERALL) 20 MG tablet Take 20 mg by mouth 3 times daily. Historical Provider, MD   melatonin 5 mg TABS tablet Take 10 mg by mouth nightly     Historical Provider, MD   diazePAM (VALIUM) 10 MG tablet Take 10 mg by mouth nightly as needed. Historical Provider, MD   naloxone 4 MG/0.1ML LIQD nasal spray 1 spray by Nasal route as needed for Opioid Reversal 21   Liam Scott PA-C   tamsulosin [facility]) 0.4 MG capsule Take 1 capsule by mouth daily 21   Jessica Gonzales MD   triamcinolone (KENALOG) 0.1 % cream Apply topically 2 times daily.  20   Jessica Gonzales MD   naproxen (NAPROSYN) 500 MG tablet Take 1 tablet by mouth 2 times daily  Patient taking differently: Take 250 mg by mouth 2 times daily  6/15/20   Liam Scott PA-C   propranolol (INDERAL LA) 80 MG extended release capsule Take 1 capsule by mouth daily 19   Jessica Gonzales MD   diclofenac sodium 1 % GEL Apply 2 g topically 2 times daily 18   Jessica Gonzales MD       Current medications:    Current Outpatient Medications   Medication Sig Dispense Refill    diphenoxylate-atropine (LOMOTIL) 2.5-0.025 MG per tablet Take 2 tablets by mouth daily as needed for Diarrhea.  oxyCODONE (OXY-IR) 30 MG immediate release tablet Take 30 mg by mouth every 6 hours as needed for Pain.  cetirizine (ZYRTEC) 10 MG tablet Take 10 mg by mouth daily      amphetamine-dextroamphetamine (ADDERALL) 20 MG tablet Take 20 mg by mouth 3 times daily.  melatonin 5 mg TABS tablet Take 10 mg by mouth nightly       diazePAM (VALIUM) 10 MG tablet Take 10 mg by mouth nightly as needed.  naloxone 4 MG/0.1ML LIQD nasal spray 1 spray by Nasal route as needed for Opioid Reversal 1 each 5    tamsulosin (FLOMAX) 0.4 MG capsule Take 1 capsule by mouth daily 90 capsule 3    triamcinolone (KENALOG) 0.1 % cream Apply topically 2 times daily. 15 g 1    naproxen (NAPROSYN) 500 MG tablet Take 1 tablet by mouth 2 times daily (Patient taking differently: Take 250 mg by mouth 2 times daily ) 60 tablet 5    propranolol (INDERAL LA) 80 MG extended release capsule Take 1 capsule by mouth daily 180 capsule 1    diclofenac sodium 1 % GEL Apply 2 g topically 2 times daily 1 Tube 1     No current facility-administered medications for this visit.        Allergies:  No Known Allergies    Problem List:    Patient Active Problem List   Diagnosis Code    Osteoarthritis of left knee M17.12    DDD (degenerative disc disease) [id number]    Discitis M46.40    Cataract H26.9    Trauma T14.90XA    Multiple closed fractures of ribs of left side S22.42XA    Hemothorax on left J94.2    S/P ERCP Z98.890       Past Medical History:        Diagnosis Date    Arthritis     OSTEO    Back problem     lumbar disc with sciatic pain down lt leg;    Depression     ADHD; bi-polar    Enlarged gallbladder     Essential tremor     Fatty liver     Hyperlipidemia     Irritable bowel syndrome     Osteoarthritis     Reflux esophagitis     Vitamin D deficiency        Past Surgical History:        Procedure Laterality Date    BONE BIOPSY  [id number] percutaneous boipsy L5 S1 and interspace biopsy    CARPAL TUNNEL RELEASE      bilat    CATARACT REMOVAL WITH IMPLANT Left 7/16/14    CATARACT REMOVAL WITH IMPLANT Right 8/14/2014    COLONOSCOPY  11/07/2014    ERCP N/A 5/27/2022    ERCP DIAGNOSTIC (RADIOLOGY NOTIFIED) performed by Hoda Raymundo MD at [address]  2012    total left knee    KNEE ARTHROSCOPY      right    OTHER SURGICAL HISTORY      lt ulnar nerve     SHOULDER ARTHROSCOPY Bilateral      for rt & lt arthritis     VITRECTOMY Left 12/18/2013       Social History:    Social History     Tobacco Use    Smoking status: Former Smoker     Packs/day: 1.00     Years: 10.00     Pack years: 10.00    Smokeless tobacco: Never Used   Substance Use Topics    Alcohol use: Yes     Alcohol/week: 14.0 standard drinks     Types: 14 Cans of beer per week                                Counseling given: Not Answered      Vital Signs (Current): There were no vitals filed for this visit.                                            BP Readings from Last 3 Encounters:   05/28/22 (!) 106/53   11/18/19 110/70   09/30/19 (!) 120/59       NPO Status:                                                                                 BMI:   Wt Readings from Last 3 Encounters:   05/28/22 169 lb 9.6 oz (76.9 kg)   11/18/19 162 lb (73.5 kg)   09/29/19 160 lb (72.6 kg)     There is no height or weight on file to calculate BMI.    CBC:   Lab Results   Component Value Date/Time    WBC 5.9 05/28/2022 02:39 AM    RBC 3.77 05/28/2022 02:39 AM    HGB 12.4 05/28/2022 02:39 AM    HCT 37.2 05/28/2022 02:39 AM    MCV 98.7 05/28/2022 02:39 AM    RDW 11.9 05/28/2022 02:39 AM     05/28/2022 02:39 AM       CMP:   Lab Results   Component Value Date/Time     09/30/2019 05:35 AM    K 4.1 09/30/2019 05:35 AM     09/30/2019 05:35 AM    CO2 27 09/30/2019 05:35 AM    BUN 11 09/30/2019 05:35 AM    CREATININE 0.8 09/30/2019 05:35 AM GFRAA >60 09/30/2019 05:35 AM    LABGLOM >60 09/30/2019 05:35 AM    GLUCOSE 109 09/30/2019 05:35 AM    PROT 5.2 05/28/2022 02:39 AM    CALCIUM 8.2 09/30/2019 05:35 AM    BILITOT 0.5 05/28/2022 02:39 AM    ALKPHOS 94 05/28/2022 02:39 AM    AST 41 05/28/2022 02:39 AM    ALT 25 05/28/2022 02:39 AM       POC Tests: No results for input(s): POCGLU, POCNA, POCK, POCCL, POCBUN, POCHEMO, POCHCT in the last 72 hours. Coags:   Lab Results   Component Value Date/Time    PROTIME 10.2 05/27/2022 01:00 PM    PROTIME 14.2 04/30/2012 02:11 AM    INR 0.9 05/27/2022 01:00 PM       HCG (If Applicable): No results found for: PREGTESTUR, PREGSERUM, HCG, HCGQUANT     ABGs: No results found for: PHART, PO2ART, CRR2QFS, SPU4MZW, BEART, U4VPGXNY     Type & Screen (If Applicable):  No results found for: LABABO, LABRH    Drug/Infectious Status (If Applicable):  No results found for: HIV, HEPCAB    COVID-19 Screening (If Applicable): No results found for: COVID19        Anesthesia Evaluation  Patient summary reviewed and Nursing notes reviewed no history of anesthetic complications:   Airway: Mallampati: III  TM distance: >3 FB   Neck ROM: limited  Mouth opening: > = 3 FB   Dental:          Pulmonary: breath sounds clear to auscultation  (+) current smoker (Vapes )    (-) sleep apnea          Patient did not smoke on day of surgery.                 ROS comment: History of left hemothorax   Cardiovascular:  Exercise tolerance: good (>4 METS),   (+) FELICIANO: no interval change, hyperlipidemia    (-) past MI, CAD, CABG/stent, dysrhythmias and  angina      Rhythm: regular  Rate: normal           Beta Blocker:  Not on Beta Blocker         Neuro/Psych:   (+) psychiatric history (ADHD):depression/anxiety  (Bipolar)   (-) seizures, TIA and CVA            ROS comment: Essential tremor  Ambulatory dysfunction GI/Hepatic/Renal:   (+) GERD (Esophagitis): no interval change, liver disease (Fatty liver):,      (-) hepatitis and no renal disease      ROS comment: BPH  IBS. Endo/Other:    (+) blood dyscrasia (Mild): anemia, arthritis: OA., .    (-) diabetes mellitus        Pt had no PAT visit       Abdominal:             Vascular: negative vascular ROS. - DVT and PE. Other Findings:             Anesthesia Plan      MAC     ASA 3     (Note copied from a previous encounter the the appropriate addendums and changes)  Induction: intravenous. MIPS: Postoperative opioids intended and Prophylactic antiemetics administered. Anesthetic plan and risks discussed with patient. Plan discussed with EUGENIE. Marianela Bernstein DO   7/11/2022        Patient seen and chart reviewed. No interval change in history or exam.   Anesthesia plan discussed, risk/benefits addressed. Patient's concerns and questions answered.      MARCY Mckeon - CRNA  July 11, 2022  1:51 PM

## 2022-07-11 NOTE — BRIEF OP NOTE
Brief Postoperative Note    Ale Nogueira  YOB: 1950  [id number]    Procedure:  ERCP    Anesthesia: [facility]      Surgeon:  Paty Christensen MD      Findings: CBD: OLD STENT WAS REMOVED. BALLOON CHOLANGIOGRAPHY SHOWED A FILLING DEFECT C/W STONE.  BALLOON SWEEPING RESULTED IN EXTRACTION. GOOD BILIARY DRAINAGE.                                           Complications: None      Estimated blood loss: none        Phineas Mcburney, MD

## 2022-07-12 NOTE — OP NOTE
[address] [address]                                OPERATIVE REPORT    PATIENT NAME: Laura Wilcox                     :        1950  MED REC NO:   [id number]                            ROOM:  ACCOUNT NO:   [de-identified]                           ADMIT DATE: 2022  PROVIDER:     Jer Velasquez MD    DATE OF PROCEDURE:  2022    PROCEDURE PERFORMED:  ERCP with stent removal and stone extraction. PREOPERATIVE DIAGNOSES:  Status post ERCP, papillotomy, stone  extraction, and stent placement, here for stent removal and evaluation. POSTOPERATIVE DIAGNOSES:  Old stent was removed. Balloon  cholangiography showed a filling defect consistent with stone. Balloon  sweeping resulted in stone removal.  Excellent biliary drainage was  obtained. ANESTHESIA:  LMAC. ESTIMATED BLOOD LOSS:  N/A    DESCRIPTION OF PROCEDURE:  With the patient in his left lateral  decubitus position, the Olympus side-viewing video duodenoscope was  introduced into the esophagus, advanced through the GE junction into the  gastric body, advanced through the pylorus into the duodenal bulb and  second portion of the duodenum where papilla was visualized and  positioned at 12 o'clock position. The stent was seen and was removed  using a rat tooth forceps. Common bile duct was then cannulated with a  papillotome and deeply cannulated with a guidewire over which the  papillotome was exchanged for number 9 to 12 Azeri balloon. Balloon  cholangiography showed a filling defect consistent with a stone. Balloon sweeping resulted in stone extraction. Pictures were taken. Multiple sweepings resulted in excellent biliary drainage. Pictures  were taken endoscopically and radiologically. The scope was retrieved,  the area decompressed, and procedure was terminated. The patient  tolerated it well.         Shari Valencia MD    D: 07/11/2022 14:36:22       T: 07/11/2022 14:40:04     [email or web address]  Job#: [id number]     Doc#: [id number]    CC:  MD Char Telles MD

## 2023-05-03 ENCOUNTER — APPOINTMENT (OUTPATIENT)
Dept: GENERAL RADIOLOGY | Age: 73
End: 2023-05-03
Payer: COMMERCIAL

## 2023-05-03 ENCOUNTER — APPOINTMENT (OUTPATIENT)
Dept: CT IMAGING | Age: 73
End: 2023-05-03
Payer: COMMERCIAL

## 2023-05-03 ENCOUNTER — HOSPITAL ENCOUNTER (EMERGENCY)
Age: 73
Discharge: HOME | End: 2023-05-03
Attending: EMERGENCY MEDICINE
Payer: COMMERCIAL

## 2023-05-03 VITALS
SYSTOLIC BLOOD PRESSURE: 158 MMHG | HEART RATE: 60 BPM | TEMPERATURE: 98.4 F | DIASTOLIC BLOOD PRESSURE: 84 MMHG | BODY MASS INDEX: 27.32 KG/M2 | HEIGHT: 66 IN | RESPIRATION RATE: 20 BRPM | OXYGEN SATURATION: 95 % | WEIGHT: 170 LBS

## 2023-05-03 DIAGNOSIS — R20.2 PARESTHESIA OF BOTH LEGS: Primary | ICD-10-CM

## 2023-05-03 LAB
ALBUMIN SERPL-MCNC: 4.3 G/DL (ref 3.5–5.2)
ALP SERPL-CCNC: 97 U/L (ref 40–129)
ALT SERPL-CCNC: 27 U/L (ref 0–40)
ANION GAP SERPL CALCULATED.3IONS-SCNC: 13 MMOL/L (ref 7–16)
AST SERPL-CCNC: 38 U/L (ref 0–39)
BASOPHILS # BLD: 0.06 E9/L (ref 0–0.2)
BASOPHILS NFR BLD: 0.6 % (ref 0–2)
BILIRUB SERPL-MCNC: 0.6 MG/DL (ref 0–1.2)
BUN SERPL-MCNC: 11 MG/DL (ref 6–23)
CALCIUM SERPL-MCNC: 9.3 MG/DL (ref 8.6–10.2)
CHLORIDE SERPL-SCNC: 97 MMOL/L (ref 98–107)
CO2 SERPL-SCNC: 27 MMOL/L (ref 22–29)
CREAT SERPL-MCNC: 1.3 MG/DL (ref 0.7–1.2)
EKG ATRIAL RATE: 62 BPM
EKG P AXIS: 37 DEGREES
EKG P-R INTERVAL: 132 MS
EKG Q-T INTERVAL: 408 MS
EKG QRS DURATION: 74 MS
EKG QTC CALCULATION (BAZETT): 414 MS
EKG R AXIS: 59 DEGREES
EKG T AXIS: 59 DEGREES
EKG VENTRICULAR RATE: 62 BPM
EOSINOPHIL # BLD: 0.12 E9/L (ref 0.05–0.5)
EOSINOPHIL NFR BLD: 1.3 % (ref 0–6)
ERYTHROCYTE [DISTWIDTH] IN BLOOD BY AUTOMATED COUNT: 13.1 FL (ref 11.5–15)
GLUCOSE SERPL-MCNC: 136 MG/DL (ref 74–99)
HCT VFR BLD AUTO: 52.3 % (ref 37–54)
HGB BLD-MCNC: 17.5 G/DL (ref 12.5–16.5)
IMM GRANULOCYTES # BLD: 0.04 E9/L
IMM GRANULOCYTES NFR BLD: 0.4 % (ref 0–5)
LYMPHOCYTES # BLD: 2.98 E9/L (ref 1.5–4)
LYMPHOCYTES NFR BLD: 31.2 % (ref 20–42)
MCH RBC QN AUTO: 31.7 PG (ref 26–35)
MCHC RBC AUTO-ENTMCNC: 33.5 % (ref 32–34.5)
MCV RBC AUTO: 94.7 FL (ref 80–99.9)
MONOCYTES # BLD: 1.08 E9/L (ref 0.1–0.95)
MONOCYTES NFR BLD: 11.3 % (ref 2–12)
NEUTROPHILS # BLD: 5.26 E9/L (ref 1.8–7.3)
NEUTS SEG NFR BLD: 55.2 % (ref 43–80)
PLATELET # BLD AUTO: 269 E9/L (ref 130–450)
PMV BLD AUTO: 10.7 FL (ref 7–12)
POTASSIUM SERPL-SCNC: 4.4 MMOL/L (ref 3.5–5)
PROT SERPL-MCNC: 7.5 G/DL (ref 6.4–8.3)
RBC # BLD AUTO: 5.52 E12/L (ref 3.8–5.8)
SODIUM SERPL-SCNC: 137 MMOL/L (ref 132–146)
TROPONIN, HIGH SENSITIVITY: <6 NG/L (ref 0–11)
WBC # BLD: 9.5 E9/L (ref 4.5–11.5)

## 2023-05-03 PROCEDURE — 93010 ELECTROCARDIOGRAM REPORT: CPT | Performed by: INTERNAL MEDICINE

## 2023-05-03 PROCEDURE — 85025 COMPLETE CBC W/AUTO DIFF WBC: CPT

## 2023-05-03 PROCEDURE — 70450 CT HEAD/BRAIN W/O DYE: CPT

## 2023-05-03 PROCEDURE — 93005 ELECTROCARDIOGRAM TRACING: CPT | Performed by: STUDENT IN AN ORGANIZED HEALTH CARE EDUCATION/TRAINING PROGRAM

## 2023-05-03 PROCEDURE — 99285 EMERGENCY DEPT VISIT HI MDM: CPT

## 2023-05-03 PROCEDURE — 71045 X-RAY EXAM CHEST 1 VIEW: CPT

## 2023-05-03 PROCEDURE — 80053 COMPREHEN METABOLIC PANEL: CPT

## 2023-05-03 PROCEDURE — 84484 ASSAY OF TROPONIN QUANT: CPT

## 2024-07-11 ENCOUNTER — APPOINTMENT (OUTPATIENT)
Dept: GENERAL RADIOLOGY | Age: 74
End: 2024-07-11
Payer: COMMERCIAL

## 2024-07-11 ENCOUNTER — HOSPITAL ENCOUNTER (EMERGENCY)
Age: 74
Discharge: HOME | End: 2024-07-11
Attending: EMERGENCY MEDICINE
Payer: COMMERCIAL

## 2024-07-11 VITALS
RESPIRATION RATE: 16 BRPM | TEMPERATURE: 98 F | HEIGHT: 66 IN | OXYGEN SATURATION: 98 % | BODY MASS INDEX: 23.78 KG/M2 | HEART RATE: 104 BPM | WEIGHT: 148 LBS | SYSTOLIC BLOOD PRESSURE: 119 MMHG | DIASTOLIC BLOOD PRESSURE: 95 MMHG

## 2024-07-11 DIAGNOSIS — S22.49XG CLOSED FRACTURE OF MULTIPLE RIBS WITH DELAYED HEALING, UNSPECIFIED LATERALITY, SUBSEQUENT ENCOUNTER: ICD-10-CM

## 2024-07-11 DIAGNOSIS — R00.0 TACHYCARDIA: Primary | ICD-10-CM

## 2024-07-11 DIAGNOSIS — F10.90 CHRONIC ALCOHOL USE: ICD-10-CM

## 2024-07-11 LAB
ALBUMIN SERPL-MCNC: 3.8 G/DL (ref 3.5–5.2)
ALP SERPL-CCNC: 84 U/L (ref 40–129)
ALT SERPL-CCNC: 9 U/L (ref 0–40)
AMPHET UR QL SCN: POSITIVE
ANION GAP SERPL CALCULATED.3IONS-SCNC: 15 MMOL/L (ref 7–16)
APAP SERPL-MCNC: <5 UG/ML (ref 10–30)
AST SERPL-CCNC: 18 U/L (ref 0–39)
BACTERIA URNS QL MICRO: ABNORMAL
BARBITURATES UR QL SCN: NEGATIVE
BASOPHILS # BLD: 0.03 K/UL (ref 0–0.2)
BASOPHILS NFR BLD: 0 % (ref 0–2)
BENZODIAZ UR QL: POSITIVE
BILIRUB SERPL-MCNC: 0.7 MG/DL (ref 0–1.2)
BILIRUB UR QL STRIP: NEGATIVE
BNP SERPL-MCNC: 229 PG/ML (ref 0–125)
BUN SERPL-MCNC: 9 MG/DL (ref 6–23)
BUPRENORPHINE UR QL: NEGATIVE
CALCIUM SERPL-MCNC: 8.8 MG/DL (ref 8.6–10.2)
CANNABINOIDS UR QL SCN: POSITIVE
CASTS #/AREA URNS LPF: ABNORMAL /LPF
CHLORIDE SERPL-SCNC: 99 MMOL/L (ref 98–107)
CK SERPL-CCNC: 28 U/L (ref 20–200)
CLARITY UR: CLEAR
CO2 SERPL-SCNC: 23 MMOL/L (ref 22–29)
COCAINE UR QL SCN: NEGATIVE
COLOR UR: YELLOW
CREAT SERPL-MCNC: 1 MG/DL (ref 0.7–1.2)
D-DIMER QUANTITATIVE: <200 NG/ML DDU (ref 0–230)
EOSINOPHIL # BLD: 0.02 K/UL (ref 0.05–0.5)
EOSINOPHILS RELATIVE PERCENT: 0 % (ref 0–6)
ERYTHROCYTE [DISTWIDTH] IN BLOOD BY AUTOMATED COUNT: 11.9 % (ref 11.5–15)
ETHANOLAMINE SERPL-MCNC: <10 MG/DL (ref 0–0.08)
FENTANYL UR QL: NEGATIVE
GFR, ESTIMATED: 81 ML/MIN/1.73M2
GLUCOSE SERPL-MCNC: 116 MG/DL (ref 74–99)
GLUCOSE UR STRIP-MCNC: NEGATIVE MG/DL
HCT VFR BLD AUTO: 42.1 % (ref 37–54)
HGB BLD-MCNC: 14.4 G/DL (ref 12.5–16.5)
HGB UR QL STRIP.AUTO: ABNORMAL
IMM GRANULOCYTES # BLD AUTO: <0.03 K/UL (ref 0–0.58)
IMM GRANULOCYTES NFR BLD: 0 % (ref 0–5)
KETONES UR STRIP-MCNC: ABNORMAL MG/DL
LACTATE BLDV-SCNC: 2.2 MMOL/L (ref 0.5–2.2)
LEUKOCYTE ESTERASE UR QL STRIP: ABNORMAL
LYMPHOCYTES NFR BLD: 1.66 K/UL (ref 1.5–4)
LYMPHOCYTES RELATIVE PERCENT: 21 % (ref 20–42)
MAGNESIUM SERPL-MCNC: 1.6 MG/DL (ref 1.6–2.6)
MCH RBC QN AUTO: 32.1 PG (ref 26–35)
MCHC RBC AUTO-ENTMCNC: 34.2 G/DL (ref 32–34.5)
MCV RBC AUTO: 94 FL (ref 80–99.9)
METHADONE UR QL: NEGATIVE
MONOCYTES NFR BLD: 0.73 K/UL (ref 0.1–0.95)
MONOCYTES NFR BLD: 9 % (ref 2–12)
NEUTROPHILS NFR BLD: 69 % (ref 43–80)
NEUTS SEG NFR BLD: 5.46 K/UL (ref 1.8–7.3)
NITRITE UR QL STRIP: NEGATIVE
OPIATES UR QL SCN: NEGATIVE
OXYCODONE UR QL SCN: POSITIVE
PCP UR QL SCN: NEGATIVE
PH UR STRIP: 6 [PH] (ref 5–9)
PLATELET # BLD AUTO: 177 K/UL (ref 130–450)
PMV BLD AUTO: 11 FL (ref 7–12)
POTASSIUM SERPL-SCNC: 3.5 MMOL/L (ref 3.5–5)
PROCALCITONIN SERPL-MCNC: 0.05 NG/ML (ref 0–0.08)
PROT SERPL-MCNC: 6.9 G/DL (ref 6.4–8.3)
PROT UR STRIP-MCNC: NEGATIVE MG/DL
RBC # BLD AUTO: 4.48 M/UL (ref 3.8–5.8)
RBC #/AREA URNS HPF: ABNORMAL /HPF
SALICYLATES SERPL-MCNC: <0.3 MG/DL (ref 0–30)
SODIUM SERPL-SCNC: 137 MMOL/L (ref 132–146)
SP GR UR STRIP: 1.01 (ref 1–1.03)
TEST INFORMATION: ABNORMAL
TOXIC TRICYCLIC SC,BLOOD: NEGATIVE
TROPONIN I SERPL HS-MCNC: 7 NG/L (ref 0–11)
TROPONIN I SERPL HS-MCNC: 9 NG/L (ref 0–11)
UROBILINOGEN UR STRIP-ACNC: 0.2 EU/DL (ref 0–1)
WBC #/AREA URNS HPF: ABNORMAL /HPF
WBC OTHER # BLD: 7.9 K/UL (ref 4.5–11.5)

## 2024-07-11 PROCEDURE — 80143 DRUG ASSAY ACETAMINOPHEN: CPT

## 2024-07-11 PROCEDURE — 96360 HYDRATION IV INFUSION INIT: CPT

## 2024-07-11 PROCEDURE — 96361 HYDRATE IV INFUSION ADD-ON: CPT

## 2024-07-11 PROCEDURE — 93005 ELECTROCARDIOGRAM TRACING: CPT | Performed by: EMERGENCY MEDICINE

## 2024-07-11 PROCEDURE — 82550 ASSAY OF CK (CPK): CPT

## 2024-07-11 PROCEDURE — 71045 X-RAY EXAM CHEST 1 VIEW: CPT

## 2024-07-11 PROCEDURE — 6370000000 HC RX 637 (ALT 250 FOR IP): Performed by: EMERGENCY MEDICINE

## 2024-07-11 PROCEDURE — 2580000003 HC RX 258: Performed by: EMERGENCY MEDICINE

## 2024-07-11 PROCEDURE — 83880 ASSAY OF NATRIURETIC PEPTIDE: CPT

## 2024-07-11 PROCEDURE — 85379 FIBRIN DEGRADATION QUANT: CPT

## 2024-07-11 PROCEDURE — 99285 EMERGENCY DEPT VISIT HI MDM: CPT

## 2024-07-11 PROCEDURE — 81001 URINALYSIS AUTO W/SCOPE: CPT

## 2024-07-11 PROCEDURE — 84145 PROCALCITONIN (PCT): CPT

## 2024-07-11 PROCEDURE — 83735 ASSAY OF MAGNESIUM: CPT

## 2024-07-11 PROCEDURE — 80053 COMPREHEN METABOLIC PANEL: CPT

## 2024-07-11 PROCEDURE — 80307 DRUG TEST PRSMV CHEM ANLYZR: CPT

## 2024-07-11 PROCEDURE — 83605 ASSAY OF LACTIC ACID: CPT

## 2024-07-11 PROCEDURE — 84484 ASSAY OF TROPONIN QUANT: CPT

## 2024-07-11 PROCEDURE — G0480 DRUG TEST DEF 1-7 CLASSES: HCPCS

## 2024-07-11 PROCEDURE — 85025 COMPLETE CBC W/AUTO DIFF WBC: CPT

## 2024-07-11 PROCEDURE — 80179 DRUG ASSAY SALICYLATE: CPT

## 2024-07-11 RX ORDER — LORAZEPAM 1 MG/1
1 TABLET ORAL
Status: DISCONTINUED | OUTPATIENT
Start: 2024-07-11 | End: 2024-07-11 | Stop reason: HOSPADM

## 2024-07-11 RX ORDER — LORAZEPAM 1 MG/1
2 TABLET ORAL
Status: DISCONTINUED | OUTPATIENT
Start: 2024-07-11 | End: 2024-07-11 | Stop reason: HOSPADM

## 2024-07-11 RX ORDER — LORAZEPAM 2 MG/ML
3 INJECTION INTRAMUSCULAR
Status: DISCONTINUED | OUTPATIENT
Start: 2024-07-11 | End: 2024-07-11 | Stop reason: HOSPADM

## 2024-07-11 RX ORDER — 0.9 % SODIUM CHLORIDE 0.9 %
1000 INTRAVENOUS SOLUTION INTRAVENOUS ONCE
Status: COMPLETED | OUTPATIENT
Start: 2024-07-11 | End: 2024-07-11

## 2024-07-11 RX ORDER — LORAZEPAM 1 MG/1
3 TABLET ORAL
Status: DISCONTINUED | OUTPATIENT
Start: 2024-07-11 | End: 2024-07-11 | Stop reason: HOSPADM

## 2024-07-11 RX ORDER — LORAZEPAM 2 MG/ML
1 INJECTION INTRAMUSCULAR
Status: DISCONTINUED | OUTPATIENT
Start: 2024-07-11 | End: 2024-07-11 | Stop reason: HOSPADM

## 2024-07-11 RX ORDER — LORAZEPAM 2 MG/ML
4 INJECTION INTRAMUSCULAR
Status: DISCONTINUED | OUTPATIENT
Start: 2024-07-11 | End: 2024-07-11 | Stop reason: HOSPADM

## 2024-07-11 RX ORDER — LANOLIN ALCOHOL/MO/W.PET/CERES
100 CREAM (GRAM) TOPICAL DAILY
Status: DISCONTINUED | OUTPATIENT
Start: 2024-07-11 | End: 2024-07-11 | Stop reason: HOSPADM

## 2024-07-11 RX ORDER — LORAZEPAM 2 MG/ML
2 INJECTION INTRAMUSCULAR
Status: DISCONTINUED | OUTPATIENT
Start: 2024-07-11 | End: 2024-07-11 | Stop reason: HOSPADM

## 2024-07-11 RX ORDER — LORAZEPAM 1 MG/1
4 TABLET ORAL
Status: DISCONTINUED | OUTPATIENT
Start: 2024-07-11 | End: 2024-07-11 | Stop reason: HOSPADM

## 2024-07-11 RX ADMIN — SODIUM CHLORIDE 1000 ML: 9 INJECTION, SOLUTION INTRAVENOUS at 16:38

## 2024-07-11 RX ADMIN — Medication 100 MG: at 19:02

## 2024-07-11 ASSESSMENT — LIFESTYLE VARIABLES
HOW OFTEN DO YOU HAVE A DRINK CONTAINING ALCOHOL: 4 OR MORE TIMES A WEEK
HOW MANY STANDARD DRINKS CONTAINING ALCOHOL DO YOU HAVE ON A TYPICAL DAY: 5 OR 6

## 2024-07-11 NOTE — ED PROVIDER NOTES
pneumonia.  Follow-up study recommended.           No results found.    No results found.    PROCEDURES   Unless otherwise noted below, none             PAST MEDICAL HISTORY/Chronic Conditions Affecting Care      has a past medical history of Arthritis, Back problem, Depression, Enlarged gallbladder, Essential tremor, Fatty liver, Hyperlipidemia, Irritable bowel syndrome, Osteoarthritis, Reflux esophagitis, and Vitamin D deficiency.     EMERGENCY DEPARTMENT COURSE    Vitals:    Vitals:    07/11/24 1853 07/11/24 1854 07/11/24 1859 07/11/24 1955   BP:  (!) 156/93 (!) 156/93    Pulse: (!) 105 (!) 102 (!) 102 (!) 104   Resp:  16  16   Temp: 98 °F (36.7 °C)      TempSrc: Oral      SpO2:  95%  98%   Weight:       Height:           Patient was given the following medications:  Medications   thiamine tablet 100 mg (100 mg Oral Given 7/11/24 1902)   LORazepam (ATIVAN) tablet 1 mg (has no administration in time range)     Or   LORazepam (ATIVAN) injection 1 mg (has no administration in time range)     Or   LORazepam (ATIVAN) tablet 2 mg (has no administration in time range)     Or   LORazepam (ATIVAN) injection 2 mg (has no administration in time range)     Or   LORazepam (ATIVAN) tablet 3 mg (has no administration in time range)     Or   LORazepam (ATIVAN) injection 3 mg (has no administration in time range)     Or   LORazepam (ATIVAN) tablet 4 mg (has no administration in time range)     Or   LORazepam (ATIVAN) injection 4 mg (has no administration in time range)   sodium chloride 0.9 % bolus 1,000 mL (0 mLs IntraVENous Stopped 7/11/24 1810)                  Medical Decision Making/Differential Diagnosis:    CC/HPI Summary, Social Determinants of health, Records Reviewed, DDx, testing done/not done, ED Course, Reassessment, disposition considerations/shared decision making with patient, consults, disposition:            Medical Decision Making  History From: Patient, EMS, EMR     Limitations to history : None    Jerson CORONADO  discharge home with outpatient follow-up however for worsening signs or symptoms      The patient was placed on the cardiac monitor for continuous monitoring of rhythm and vitals. EKG ordered to evaluate patient's current cardiac rate, rhythm, and QT interval. CBC was ordered as part of my assessment for possible infection, anemia or thrombocytopenia. CMP to assess electrolytes, kidney function, liver function or any metabolic derangements. Lipase to evaluate for pancreatitis. Lactic acid as a marker of hypoperfusion or ischemia. Troponin as a marker for myocardial ischemia or heart strain. BNP to evaluate for heart failure and/or as a marker for heart strain. D-dimer used to rule out PE given low pre-test probability. Chest x-ray for any possible signs of, but without limitation to, pneumonia, pleural effusions, cardiomegaly, pneumothorax, atelectasis, rib or sternal abnormalities including fractures.        Amount and/or Complexity of Data Reviewed  External Data Reviewed: notes.  Labs: ordered. Decision-making details documented in ED Course.  Radiology: ordered and independent interpretation performed. Decision-making details documented in ED Course.  ECG/medicine tests: ordered and independent interpretation performed. Decision-making details documented in ED Course.    Risk  OTC drugs.  Prescription drug management.  Drug therapy requiring intensive monitoring for toxicity.    Critical Care  Total time providing critical care: minutes (Please note that the withdrawal or failure to initiate urgent interventions for this patient would likely result in a life threatening deterioration or permanent disability.      Accordingly this patient received 31 minutes of critical care time, excluding separately billable procedures.  )             CONSULTS: (Who and What was discussed)  IP CONSULT TO SOCIAL WORK -patient was not agreeable to talk to social work regarding detox         I am the Primary Clinician of

## 2024-07-12 LAB
EKG ATRIAL RATE: 141 BPM
EKG P AXIS: 17 DEGREES
EKG P-R INTERVAL: 100 MS
EKG Q-T INTERVAL: 300 MS
EKG QRS DURATION: 78 MS
EKG QTC CALCULATION (BAZETT): 459 MS
EKG R AXIS: 47 DEGREES
EKG T AXIS: -86 DEGREES
EKG VENTRICULAR RATE: 141 BPM

## 2024-07-12 PROCEDURE — 93010 ELECTROCARDIOGRAM REPORT: CPT | Performed by: INTERNAL MEDICINE

## 2024-07-12 NOTE — DISCHARGE INSTRUCTIONS
Chemical Dependence & Behavioral Health Resources    For Residents of [facility] and [facility]  Call [facility] [phone]    For a complete list of treatment centers in your area please visit:   “[facility] [place]” website at   [email or web address]    * Alcoholics Anonymous [phone] 362-8890                *Narcotics Anonymous [phone]) 049-1249   * National Suicide Hotline 24/7   1-940.763.2492 or  dial 988      [facility]  [address] [address]  Phone: [phone]    * Outpatient Mental Health Treatment   * Outpatient Substance Abuse Treatment    [facility] [phone] [address] [place] [address]  [email or web address]  *Payments Accepted: Sliding Scale for Fees.  *Services Offered: Call for Available Services.     [facility] [phone]  [address] [place] [address]  [email or web address]  *Residents of:  [facility] Residents Only for Private Insurance.     ALL [facility] Residents on Medicaid Accepted.   *Payments Accepted: Medicaid or Self-pay ONLY for In-Patient Services.         Private Insurance accepted for Outpatient Programs   *Services Offered: Outpatient Behavioral Health & Chemical Dependency.    [facility] [phone] or [phone]  [email or web address]     *Residents of:  All [facility] Residents   *Payments Accepted: Private Insurance, Medicaid or Self-Pay.  *Services Offered: Following Detox, Outpatient Substance Abuse and Behavioral Health Programs  [facility]  [address]    [address] [place] [address]   [place], [place] [address]    [facility]   [address]    [address]   [place], [place] [address]   [place] [id number]    [facility] [phone]  Medicare, Medicaid, Private Insurance and Self-Pay  *Services Offered: Behavior Health Services for Counseling.      [facility] [phone] [address] [place] [address]  [email or web address]  *Residents of:  All [facility]   *Payments Accepted:  Self-Pay, Private Insurance, Some Medicaid for age <20 Years.   *Services Offered: Detox

## (undated) DEVICE — WIREGUIDED CYTOLOGY BRUSH: Brand: RX CYTOLOGY BRUSH

## (undated) DEVICE — SPONGE GZ W4XL4IN RAYON POLY FILL CVR W/ NONWOVEN FAB

## (undated) DEVICE — BLOCK BITE 60FR RUBBER ADLT DENTAL

## (undated) DEVICE — GRADUATE TRIANG MEASURE 1000ML BLK PRNT

## (undated) DEVICE — SYSTEM BX CAP BILI RAP EXCHG CAP LOK DEV COMPATIBLE W/ OLY

## (undated) DEVICE — RETRIEVAL BALLOON CATHETER: Brand: EXTRACTOR™ PRO RX

## (undated) DEVICE — ELECTRODE PT RET AD L9FT HI MOIST COND ADH HYDRGEL CORDED

## (undated) DEVICE — SPHINCTEROTOME: Brand: DREAMTOME™ RX 44